# Patient Record
Sex: MALE | Race: WHITE | NOT HISPANIC OR LATINO | URBAN - METROPOLITAN AREA
[De-identification: names, ages, dates, MRNs, and addresses within clinical notes are randomized per-mention and may not be internally consistent; named-entity substitution may affect disease eponyms.]

---

## 2017-07-29 ENCOUNTER — EMERGENCY (EMERGENCY)
Facility: HOSPITAL | Age: 52
LOS: 1 days | Discharge: HOME | End: 2017-07-29
Attending: EMERGENCY MEDICINE | Admitting: EMERGENCY MEDICINE
Payer: OTHER MISCELLANEOUS

## 2017-07-29 VITALS
OXYGEN SATURATION: 98 % | DIASTOLIC BLOOD PRESSURE: 66 MMHG | SYSTOLIC BLOOD PRESSURE: 143 MMHG | HEART RATE: 90 BPM | TEMPERATURE: 98 F | RESPIRATION RATE: 17 BRPM

## 2017-07-29 DIAGNOSIS — Z98.89 OTHER SPECIFIED POSTPROCEDURAL STATES: Chronic | ICD-10-CM

## 2017-07-29 PROCEDURE — 99053 MED SERV 10PM-8AM 24 HR FAC: CPT

## 2017-07-29 PROCEDURE — 12002 RPR S/N/AX/GEN/TRNK2.6-7.5CM: CPT

## 2017-07-29 PROCEDURE — 99284 EMERGENCY DEPT VISIT MOD MDM: CPT | Mod: 25

## 2017-07-29 RX ORDER — CEPHALEXIN 500 MG
500 CAPSULE ORAL ONCE
Qty: 0 | Refills: 0 | Status: COMPLETED | OUTPATIENT
Start: 2017-07-29 | End: 2017-07-29

## 2017-07-29 RX ORDER — CEPHALEXIN 500 MG
1 CAPSULE ORAL
Qty: 9 | Refills: 0
Start: 2017-07-29

## 2017-07-29 RX ORDER — TETANUS TOXOID, REDUCED DIPHTHERIA TOXOID AND ACELLULAR PERTUSSIS VACCINE, ADSORBED 5; 2.5; 8; 8; 2.5 [IU]/.5ML; [IU]/.5ML; UG/.5ML; UG/.5ML; UG/.5ML
0.5 SUSPENSION INTRAMUSCULAR ONCE
Qty: 0 | Refills: 0 | Status: COMPLETED | OUTPATIENT
Start: 2017-07-29 | End: 2017-07-29

## 2017-07-29 RX ORDER — LIDOCAINE HCL 20 MG/ML
10 VIAL (ML) INJECTION ONCE
Qty: 0 | Refills: 0 | Status: COMPLETED | OUTPATIENT
Start: 2017-07-29 | End: 2017-07-29

## 2017-07-29 RX ADMIN — Medication 500 MILLIGRAM(S): at 10:14

## 2017-07-29 RX ADMIN — TETANUS TOXOID, REDUCED DIPHTHERIA TOXOID AND ACELLULAR PERTUSSIS VACCINE, ADSORBED 0.5 MILLILITER(S): 5; 2.5; 8; 8; 2.5 SUSPENSION INTRAMUSCULAR at 08:30

## 2017-07-29 RX ADMIN — Medication 10 MILLILITER(S): at 09:13

## 2017-07-29 NOTE — ED PROVIDER NOTE - OBJECTIVE STATEMENT
50 y/o M pt with PMHx of HLD c/o laceration to the left first digit 30 minutes ago. Pt is a  and was trying to sharpen his razor tool. Notes his hand slipped and cut his left first digit. Pt doesn't remember his last tetanus shot. Denies weakness or any other complaints. NKDA. Current medication: Lipitor, aspirin 52 y/o M pt with PMHx of HLD c/o laceration to the left first digit 30 minutes ago. Pt is a  and was trying to sharpen his razor tool. Notes his hand slipped and cut his left first digit. Pt doesn't remember his last tetanus shot. Denies weakness or any other complaints. NKDA. Current medication: Lipitor, aspirin.  Pt denies any injury elsewhere

## 2017-07-29 NOTE — ED PROVIDER NOTE - PROGRESS NOTE DETAILS
When cleaning wound, wound opened up more- will perform suture placement instead of dermabond. Wound superficial approximately 1 cm at thumb pad and going lateral to nail. Distal neurovascular intact Wound repaired. See procedure note for further details. Will discharge with keflex prophylaxis. Given note for work. When cleaning wound, wound opened up more- will perform suture placement instead of dermabond. Wound superficial approximately 4 cm at thumb pad and going lateral to nail. Distal neurovascular intact

## 2017-07-29 NOTE — ED PROVIDER NOTE - MEDICAL DECISION MAKING DETAILS
52 y/o M pt presents to the ED with laceration to the left 1st digit 30 minutes. Simple small laceration. Plan: will clean wound, tetanus and Dermabond. 50 y/o M pt presents to the ED with laceration to the left 1st digit 30 minutes. Simple small laceration. Plan: will clean wound, tetanus and Dermabond. Pt was offered but declined pain medication.

## 2017-07-29 NOTE — ED PROVIDER NOTE - PSH
Elbow pain, right  had surgery  S/P appendectomy  1981  S/P arthroscopy of right knee  March 2015  S/P ORIF (open reduction internal fixation) fracture  with ? partial hardware removal, right femur

## 2017-07-29 NOTE — ED PROVIDER NOTE - PMH
Fracture of femur  1983 & had surgery - Right  HLD (hyperlipidemia)    Knee injury  right  Obesity    Sleep apnea  not  using any Device at present

## 2017-07-29 NOTE — ED PROVIDER NOTE - ATTENDING CONTRIBUTION TO CARE
I performed a face to face evaluation of this patient and performed a full history and physical examination on the patient.  I agree with the resident's history, physical examination, and plan of the patient.  Pt with left thumb wound from razor while working, well appearing with left thumb laceration to tip pad and going around lateral to nail, approximately 1 cm, superficial, distal neurovascular intact. Tetanus, and suture.  Patient declined pain meds

## 2017-07-29 NOTE — ED PROVIDER NOTE - CARE PLAN
Principal Discharge DX:	Laceration of thumb Principal Discharge DX:	Laceration of thumb  Instructions for follow-up, activity and diet:	You received your Tdap (tetanus) vaccine today. Keep wound clean and dry. Apply bacitracin twice a day. Take Keflex (antibiotic) 500 mg every 12 hours for next 5 days (first dose given in Emergency Department). Return to Emergency Department or see your primary care doctor in 2 days for wound check. Return to Emergency Department or see your primary care doctor in 7-10 days for suture removal. Recommend not working until wound heals. Take ibuprofen (also known as Motrin or Advil) up to 600 mg (5 tablets of regular strength) up to every 8 hours as needed for pain. Take with food or milk to avoid stomach upset. Do not take every 8 hours continuously for more than 5 days to avoid potential problems with your kidneys. Return to Emergency Department for any new, worsening, and/or concerning symptoms, including but not limited to worsening pain, swelling of finger, redness of finger, foul discharge from wound, fevers. Principal Discharge DX:	Laceration of thumb  Instructions for follow-up, activity and diet:	You received your Tdap (tetanus) vaccine today. Keep wound clean and dry. Apply bacitracin twice a day. Take Keflex (antibiotic) 500 mg every 12 hours for next 5 days (first dose given in Emergency Department). Return to Emergency Department or see your primary care doctor in 2 days for wound check. Return to Emergency Department or see your primary care doctor in 7-10 days for suture removal. Recommend not working until wound heals. Take ibuprofen (also known as Motrin or Advil) up to 600 mg (3 tablets of regular strength) up to every 8 hours as needed for pain. Take with food or milk to avoid stomach upset. Do not take every 8 hours continuously for more than 5 days to avoid potential problems with your kidneys. Return to Emergency Department for any new, worsening, and/or concerning symptoms, including but not limited to worsening pain, swelling of finger, redness of finger, foul discharge from wound, fevers.

## 2017-07-29 NOTE — ED ADULT TRIAGE NOTE - CHIEF COMPLAINT QUOTE
Pt walk in c/o Lacerated wound on Left thumb with a knife. moderate bleeding noted Pressure dressing applied

## 2017-07-29 NOTE — ED PROCEDURE NOTE - ATTENDING CONTRIBUTION TO CARE
I performed a face to face evaluation of this patient and performed a full history and physical examination on the patient.  I agree with the resident's history, physical examination, and plan of the patient. I observed the key portion of the procedure

## 2017-07-29 NOTE — ED PROVIDER NOTE - PLAN OF CARE
You received your Tdap (tetanus) vaccine today. Keep wound clean and dry. Apply bacitracin twice a day. Take Keflex (antibiotic) 500 mg every 12 hours for next 5 days (first dose given in Emergency Department). Return to Emergency Department or see your primary care doctor in 2 days for wound check. Return to Emergency Department or see your primary care doctor in 7-10 days for suture removal. Recommend not working until wound heals. Take ibuprofen (also known as Motrin or Advil) up to 600 mg (5 tablets of regular strength) up to every 8 hours as needed for pain. Take with food or milk to avoid stomach upset. Do not take every 8 hours continuously for more than 5 days to avoid potential problems with your kidneys. Return to Emergency Department for any new, worsening, and/or concerning symptoms, including but not limited to worsening pain, swelling of finger, redness of finger, foul discharge from wound, fevers. You received your Tdap (tetanus) vaccine today. Keep wound clean and dry. Apply bacitracin twice a day. Take Keflex (antibiotic) 500 mg every 12 hours for next 5 days (first dose given in Emergency Department). Return to Emergency Department or see your primary care doctor in 2 days for wound check. Return to Emergency Department or see your primary care doctor in 7-10 days for suture removal. Recommend not working until wound heals. Take ibuprofen (also known as Motrin or Advil) up to 600 mg (3 tablets of regular strength) up to every 8 hours as needed for pain. Take with food or milk to avoid stomach upset. Do not take every 8 hours continuously for more than 5 days to avoid potential problems with your kidneys. Return to Emergency Department for any new, worsening, and/or concerning symptoms, including but not limited to worsening pain, swelling of finger, redness of finger, foul discharge from wound, fevers.

## 2017-07-31 ENCOUNTER — EMERGENCY (EMERGENCY)
Facility: HOSPITAL | Age: 52
LOS: 1 days | Discharge: HOME | End: 2017-07-31
Attending: EMERGENCY MEDICINE | Admitting: EMERGENCY MEDICINE

## 2017-07-31 VITALS
DIASTOLIC BLOOD PRESSURE: 76 MMHG | SYSTOLIC BLOOD PRESSURE: 122 MMHG | TEMPERATURE: 99 F | OXYGEN SATURATION: 100 % | RESPIRATION RATE: 16 BRPM | HEART RATE: 90 BPM

## 2017-07-31 DIAGNOSIS — Z98.89 OTHER SPECIFIED POSTPROCEDURAL STATES: Chronic | ICD-10-CM

## 2017-07-31 NOTE — ED PROVIDER NOTE - SKIN WOUND DESCRIPTION
Well healing laceration at tip of left thumb with 7 sutures in place. No drainage, erythema, or warmth. Appropriate tenderness present

## 2017-07-31 NOTE — ED PROVIDER NOTE - OBJECTIVE STATEMENT
52yo M with hx of AAA presents to the ED for wound check. Pt took a 80mg aspirin and is right handed. Pt was seen here 3 days ago for a razor laceration to the left thumb and had 7 sutures in place with abx usage. Was told to come back to the ED for wound check. Denies drainage, fever, chills, or any other complaints 52yo M with hx of AAA on ASA 81mg presents to the ED for wound check. Pt is right handed. Pt was seen here 3 days ago for a razor laceration to the left thumb and had 7 sutures in place with Keflex usage. Was told to come back to the ED for wound check. Denies drainage, fever, chills, or any other complaints

## 2017-08-10 ENCOUNTER — EMERGENCY (EMERGENCY)
Facility: HOSPITAL | Age: 52
LOS: 1 days | Discharge: HOME | End: 2017-08-10
Attending: EMERGENCY MEDICINE | Admitting: EMERGENCY MEDICINE
Payer: OTHER MISCELLANEOUS

## 2017-08-10 VITALS
SYSTOLIC BLOOD PRESSURE: 128 MMHG | OXYGEN SATURATION: 100 % | RESPIRATION RATE: 16 BRPM | HEART RATE: 85 BPM | TEMPERATURE: 99 F | DIASTOLIC BLOOD PRESSURE: 88 MMHG

## 2017-08-10 DIAGNOSIS — Z98.89 OTHER SPECIFIED POSTPROCEDURAL STATES: Chronic | ICD-10-CM

## 2017-08-10 PROCEDURE — 99053 MED SERV 10PM-8AM 24 HR FAC: CPT

## 2017-08-10 PROCEDURE — 99281 EMR DPT VST MAYX REQ PHY/QHP: CPT | Mod: 25

## 2017-08-10 NOTE — ED PROVIDER NOTE - OBJECTIVE STATEMENT
presents for suture removal, 12 days post suture. no complaints. no pain, discharge or erythema. no fevers.

## 2017-08-18 NOTE — ED PROVIDER NOTE - CROS ED SKIN ALL NEG
H/O colonoscopy  06/08/2017  H/O melanoma excision  ?  H/O: hysterectomy- 1982    Hx of tonsillectomy  in childhood  Squamous cell skin cancer  excision and biopsy > 1 year  Status post lung surgery > 7 years ago  lung ca. - - -

## 2017-12-25 NOTE — ED PROVIDER NOTE - CPE EDP SKIN NORM
"  Urgent Care Note   This patients PCP is: Pcp Pt States None    Reason for visit:   left knee pain     HPI:  Radha Urena is a 47 y.o. old patient who is seeing us today in the [facility] system.  This is a pleasant patient and I appreciate the opportunity to participate in the care of this patient.  This is a new problem today    1. Knee pain, left  Left knee  pain   Fell skiing 5 days ago. And painful the last two days are worse.  Has been ICE and elevation.  Hurts to walk.  Just feels its not getting better.       ROS:   Constitutional: no fatigue,   HEENT: No Headache, No sore throat.  Cardiac: No racing heart rate  Resp: No shortness of breath,  No cough, No wheezing.  Gastro: No nausea or vomiting, No diarrhea.    All other systems were reviewed and were negative.      Past Medical History:  Patient Active Problem List    Diagnosis Date Noted   • Left foot pain 12/25/2017       Past Surgical History:  No past surgical history on file.    Allergies:  Patient has no known allergies.    Social History:  Social History     Social History   • Marital status:      Spouse name: N/A   • Number of children: N/A   • Years of education: N/A     Occupational History   • Not on file.     Social History Main Topics   • Smoking status: Not on file   • Smokeless tobacco: Not on file   • Alcohol use Not on file   • Drug use: Unknown   • Sexual activity: Not on file     Other Topics Concern   • Not on file     Social History Narrative   • No narrative on file       Family History:  No family history on file.    Medications:  No current outpatient prescriptions on file.    Physical Examination:  Vital signs: /62   Pulse 67   Temp 37.1 °C (98.7 °F)   Resp 14   Ht 1.727 m (5' 8\")   Wt 78 kg (171 lb 15.3 oz)   SpO2 98%   BMI 26.15 kg/m²   General: No distress, cooperative, well dressed and well nourished.     Resp: Normal effort, Bilateral clear to auscultation, No wheezing,   CVS: Regular rate and " rhythm,  No murmur.   Neuro: Alert and oriented  Psych: Normal mood and affect    Left knee is swollen and is painful for 5 days.  Negative drawer sign.  Negative Varus and Valgus stress.  The knee does have fluid on it it seems.  More then likely a bursitis.      Assessment and Plan:    1. Knee pain, left  Xray ordered this looks wnl.  But for fluid on the knee.  Ibuprofen, RICE which I explained to him.  Brace.    He is from the [place] and traveling back in a few days I gave him Norco for the trip since he will be in some pain.    Did not hit his head      Follow-up with your PCP in 3-7 days if you are not getting better.  Return to  or the ER if symptoms become worse.  Patient understands these discharge instructions.      Milo Perkins P.A.-C.  12/25/17    CC:   Pcp Pt States None       WOUNDS

## 2018-07-16 PROBLEM — E78.5 HYPERLIPIDEMIA, UNSPECIFIED: Chronic | Status: INACTIVE | Noted: 2017-07-29 | Resolved: 2017-07-31

## 2019-05-02 ENCOUNTER — APPOINTMENT (OUTPATIENT)
Dept: CARDIOLOGY | Facility: CLINIC | Age: 54
End: 2019-05-02
Payer: COMMERCIAL

## 2019-05-02 VITALS
RESPIRATION RATE: 12 BRPM | HEART RATE: 68 BPM | TEMPERATURE: 98.4 F | SYSTOLIC BLOOD PRESSURE: 110 MMHG | DIASTOLIC BLOOD PRESSURE: 78 MMHG

## 2019-05-02 DIAGNOSIS — I25.2 OLD MYOCARDIAL INFARCTION: ICD-10-CM

## 2019-05-02 PROBLEM — E78.5 HYPERLIPIDEMIA, UNSPECIFIED: Chronic | Status: ACTIVE | Noted: 2017-07-31

## 2019-05-02 PROCEDURE — 99204 OFFICE O/P NEW MOD 45 MIN: CPT

## 2019-05-02 PROCEDURE — 93306 TTE W/DOPPLER COMPLETE: CPT

## 2019-05-04 NOTE — REASON FOR VISIT
[FreeTextEntry1] : The patient here for evaluation of high blood pressure. Patient is currently tolerating the current antihypertensive regime and they deny headaches, stiff neck, visual changes, or PND. The patient has been trying to stay on a low-sodium diet.\par the patient is here for followup of thoracic aortic aneurysm he has a history of coronary artery disease that is being medically treated. 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

## 2019-05-04 NOTE — PHYSICAL EXAM
[General Appearance - Well Developed] : well developed [Normal Appearance] : normal appearance [General Appearance - Well Nourished] : well nourished [No Deformities] : no deformities [Well Groomed] : well groomed [General Appearance - In No Acute Distress] : no acute distress [Normal Conjunctiva] : the conjunctiva exhibited no abnormalities [No Oral Pallor] : no oral pallor [Normal Oral Mucosa] : normal oral mucosa [Eyelids - No Xanthelasma] : the eyelids demonstrated no xanthelasmas [No Oral Cyanosis] : no oral cyanosis [Normal Jugular Venous A Waves Present] : normal jugular venous A waves present [Normal Jugular Venous V Waves Present] : normal jugular venous V waves present [No Jugular Venous Walls A Waves] : no jugular venous walls A waves [Auscultation Breath Sounds / Voice Sounds] : lungs were clear to auscultation bilaterally [Exaggerated Use Of Accessory Muscles For Inspiration] : no accessory muscle use [Respiration, Rhythm And Depth] : normal respiratory rhythm and effort [Abdomen Soft] : soft [Abdomen Tenderness] : non-tender [Abdomen Mass (___ Cm)] : no abdominal mass palpated [Gait - Sufficient For Exercise Testing] : the gait was sufficient for exercise testing [Abnormal Walk] : normal gait [Petechial Hemorrhages (___cm)] : no petechial hemorrhages [Cyanosis, Localized] : no localized cyanosis [Nail Clubbing] : no clubbing of the fingernails [Skin Color & Pigmentation] : normal skin color and pigmentation [] : no rash [No Venous Stasis] : no venous stasis [Skin Lesions] : no skin lesions [No Skin Ulcers] : no skin ulcer [No Xanthoma] : no  xanthoma was observed [Affect] : the affect was normal [Mood] : the mood was normal [Oriented To Time, Place, And Person] : oriented to person, place, and time [No Anxiety] : not feeling anxious [FreeTextEntry1] : Regular rate and rhythm, NL S1, S2, non-displaced PMI, chest non-tender; no rubs,heaves  or gallops a  Grade 2/6 systolic murmur noted at the LSB

## 2019-05-06 ENCOUNTER — APPOINTMENT (OUTPATIENT)
Dept: CARDIOLOGY | Facility: CLINIC | Age: 54
End: 2019-05-06
Payer: COMMERCIAL

## 2019-05-06 PROCEDURE — 78452 HT MUSCLE IMAGE SPECT MULT: CPT

## 2019-05-06 PROCEDURE — A9500: CPT

## 2019-05-06 PROCEDURE — 93015 CV STRESS TEST SUPVJ I&R: CPT

## 2019-06-25 DIAGNOSIS — R20.2 PARESTHESIA OF SKIN: ICD-10-CM

## 2019-10-01 ENCOUNTER — APPOINTMENT (OUTPATIENT)
Dept: CARDIOLOGY | Facility: CLINIC | Age: 54
End: 2019-10-01
Payer: COMMERCIAL

## 2019-10-01 VITALS
TEMPERATURE: 98 F | OXYGEN SATURATION: 99 % | HEIGHT: 74 IN | DIASTOLIC BLOOD PRESSURE: 64 MMHG | SYSTOLIC BLOOD PRESSURE: 110 MMHG | HEART RATE: 52 BPM | WEIGHT: 239 LBS | BODY MASS INDEX: 30.67 KG/M2

## 2019-10-01 DIAGNOSIS — I65.23 OCCLUSION AND STENOSIS OF BILATERAL CAROTID ARTERIES: ICD-10-CM

## 2019-10-01 PROCEDURE — 99214 OFFICE O/P EST MOD 30 MIN: CPT

## 2019-10-01 PROCEDURE — 93000 ELECTROCARDIOGRAM COMPLETE: CPT

## 2019-10-01 NOTE — REASON FOR VISIT
[FreeTextEntry1] : The patient here for evaluation of high blood pressure. Patient is currently tolerating the current antihypertensive regime and they deny headaches, stiff neck, visual changes, or PND. The patient has been trying to stay on a low-sodium diet.\par the patient is here for followup of thoracic aortic aneurysm he has a history of coronary artery disease that is being medically treated. 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

## 2019-10-01 NOTE — PHYSICAL EXAM
[General Appearance - Well Developed] : well developed [Normal Appearance] : normal appearance [Well Groomed] : well groomed [General Appearance - Well Nourished] : well nourished [No Deformities] : no deformities [General Appearance - In No Acute Distress] : no acute distress [Normal Conjunctiva] : the conjunctiva exhibited no abnormalities [Eyelids - No Xanthelasma] : the eyelids demonstrated no xanthelasmas [Normal Oral Mucosa] : normal oral mucosa [No Oral Pallor] : no oral pallor [No Oral Cyanosis] : no oral cyanosis [Normal Jugular Venous A Waves Present] : normal jugular venous A waves present [Normal Jugular Venous V Waves Present] : normal jugular venous V waves present [No Jugular Venous Walls A Waves] : no jugular venous walls A waves [Respiration, Rhythm And Depth] : normal respiratory rhythm and effort [Exaggerated Use Of Accessory Muscles For Inspiration] : no accessory muscle use [Auscultation Breath Sounds / Voice Sounds] : lungs were clear to auscultation bilaterally [Abdomen Soft] : soft [Abdomen Tenderness] : non-tender [Abdomen Mass (___ Cm)] : no abdominal mass palpated [Abnormal Walk] : normal gait [Gait - Sufficient For Exercise Testing] : the gait was sufficient for exercise testing [Nail Clubbing] : no clubbing of the fingernails [Cyanosis, Localized] : no localized cyanosis [Petechial Hemorrhages (___cm)] : no petechial hemorrhages [Skin Color & Pigmentation] : normal skin color and pigmentation [] : no rash [No Venous Stasis] : no venous stasis [Skin Lesions] : no skin lesions [No Skin Ulcers] : no skin ulcer [No Xanthoma] : no  xanthoma was observed [Oriented To Time, Place, And Person] : oriented to person, place, and time [Affect] : the affect was normal [Mood] : the mood was normal [No Anxiety] : not feeling anxious [FreeTextEntry1] : Regular rate and rhythm, NL S1, S2, non-displaced PMI, chest non-tender; no rubs,heaves  or gallops a  Grade 2/6 systolic murmur noted at the LSB

## 2020-01-10 ENCOUNTER — MEDICATION RENEWAL (OUTPATIENT)
Age: 55
End: 2020-01-10

## 2020-01-24 PROBLEM — Z00.00 ENCOUNTER FOR PREVENTIVE HEALTH EXAMINATION: Noted: 2020-01-24

## 2020-02-06 ENCOUNTER — APPOINTMENT (OUTPATIENT)
Dept: CARDIOLOGY | Facility: CLINIC | Age: 55
End: 2020-02-06
Payer: COMMERCIAL

## 2020-02-06 ENCOUNTER — APPOINTMENT (OUTPATIENT)
Dept: CARDIOLOGY | Facility: CLINIC | Age: 55
End: 2020-02-06

## 2020-02-06 VITALS
WEIGHT: 239 LBS | DIASTOLIC BLOOD PRESSURE: 72 MMHG | TEMPERATURE: 97.8 F | OXYGEN SATURATION: 97 % | HEIGHT: 74 IN | HEART RATE: 63 BPM | BODY MASS INDEX: 30.67 KG/M2 | SYSTOLIC BLOOD PRESSURE: 119 MMHG

## 2020-02-06 PROCEDURE — 99214 OFFICE O/P EST MOD 30 MIN: CPT

## 2020-02-06 NOTE — PHYSICAL EXAM
[Normal Appearance] : normal appearance [General Appearance - Well Developed] : well developed [Well Groomed] : well groomed [General Appearance - Well Nourished] : well nourished [No Deformities] : no deformities [General Appearance - In No Acute Distress] : no acute distress [Eyelids - No Xanthelasma] : the eyelids demonstrated no xanthelasmas [Normal Conjunctiva] : the conjunctiva exhibited no abnormalities [Normal Oral Mucosa] : normal oral mucosa [No Oral Pallor] : no oral pallor [No Oral Cyanosis] : no oral cyanosis [Normal Jugular Venous A Waves Present] : normal jugular venous A waves present [Normal Jugular Venous V Waves Present] : normal jugular venous V waves present [No Jugular Venous Walls A Waves] : no jugular venous walls A waves [Respiration, Rhythm And Depth] : normal respiratory rhythm and effort [Auscultation Breath Sounds / Voice Sounds] : lungs were clear to auscultation bilaterally [Exaggerated Use Of Accessory Muscles For Inspiration] : no accessory muscle use [Heart Rate And Rhythm] : heart rate and rhythm were normal [Heart Sounds] : normal S1 and S2 [Murmurs] : no murmurs present [Abdomen Soft] : soft [Abdomen Tenderness] : non-tender [Abdomen Mass (___ Cm)] : no abdominal mass palpated [Gait - Sufficient For Exercise Testing] : the gait was sufficient for exercise testing [Abnormal Walk] : normal gait [Nail Clubbing] : no clubbing of the fingernails [Cyanosis, Localized] : no localized cyanosis [Petechial Hemorrhages (___cm)] : no petechial hemorrhages [] : no rash [Skin Color & Pigmentation] : normal skin color and pigmentation [Skin Lesions] : no skin lesions [No Venous Stasis] : no venous stasis [No Skin Ulcers] : no skin ulcer [Oriented To Time, Place, And Person] : oriented to person, place, and time [No Xanthoma] : no  xanthoma was observed [Affect] : the affect was normal [No Anxiety] : not feeling anxious [Mood] : the mood was normal

## 2020-02-11 NOTE — HISTORY OF PRESENT ILLNESS
[FreeTextEntry1] : pt is here for follow up of TAA they have been taking medication as prescribed  and  blood pressure have been observed in the a reasonable range, their are no reports of SSCP , back pain or syncope. \par 4.5 cm dilation at sinus Valsalve on last MRI in August 2018\par The patient is here for follow-up of elevated cholesterol. Patient is currently tolerating medication and denies muscle pain, joint pain, back pain,  urinary changes , nausea, vomiting, abdominal pain or diarrhea. The patient is trying to follow a low cholesterol diet.

## 2020-07-08 ENCOUNTER — RX RENEWAL (OUTPATIENT)
Age: 55
End: 2020-07-08

## 2020-08-06 RX ORDER — ASPIRIN ENTERIC COATED TABLETS 81 MG 81 MG/1
81 TABLET, DELAYED RELEASE ORAL
Qty: 30 | Refills: 3 | Status: ACTIVE | COMMUNITY
Start: 2020-08-06

## 2020-08-10 ENCOUNTER — APPOINTMENT (OUTPATIENT)
Dept: CARDIOLOGY | Facility: CLINIC | Age: 55
End: 2020-08-10
Payer: COMMERCIAL

## 2020-08-10 ENCOUNTER — NON-APPOINTMENT (OUTPATIENT)
Age: 55
End: 2020-08-10

## 2020-08-10 VITALS — OXYGEN SATURATION: 98 % | HEART RATE: 50 BPM | SYSTOLIC BLOOD PRESSURE: 118 MMHG | DIASTOLIC BLOOD PRESSURE: 70 MMHG

## 2020-08-10 DIAGNOSIS — R93.1 ABNORMAL FINDINGS ON DIAGNOSTIC IMAGING OF HEART AND CORONARY CIRCULATION: ICD-10-CM

## 2020-08-10 PROCEDURE — 99214 OFFICE O/P EST MOD 30 MIN: CPT

## 2020-08-10 PROCEDURE — 93000 ELECTROCARDIOGRAM COMPLETE: CPT

## 2020-08-11 LAB — SARS-COV-2 N GENE NPH QL NAA+PROBE: NOT DETECTED

## 2020-08-13 ENCOUNTER — INPATIENT (INPATIENT)
Facility: HOSPITAL | Age: 55
LOS: 0 days | Discharge: HOME | DRG: 247 | End: 2020-08-14
Attending: INTERNAL MEDICINE | Admitting: INTERNAL MEDICINE
Payer: COMMERCIAL

## 2020-08-13 VITALS
TEMPERATURE: 99 F | HEIGHT: 74 IN | SYSTOLIC BLOOD PRESSURE: 132 MMHG | DIASTOLIC BLOOD PRESSURE: 84 MMHG | RESPIRATION RATE: 17 BRPM | WEIGHT: 270.07 LBS | OXYGEN SATURATION: 100 % | HEART RATE: 71 BPM

## 2020-08-13 DIAGNOSIS — Z98.89 OTHER SPECIFIED POSTPROCEDURAL STATES: Chronic | ICD-10-CM

## 2020-08-13 DIAGNOSIS — R93.1 ABNORMAL FINDINGS ON DIAGNOSTIC IMAGING OF HEART AND CORONARY CIRCULATION: ICD-10-CM

## 2020-08-13 LAB
ANION GAP SERPL CALC-SCNC: 12 MMOL/L — SIGNIFICANT CHANGE UP (ref 5–17)
BUN SERPL-MCNC: 17 MG/DL — SIGNIFICANT CHANGE UP (ref 7–23)
CALCIUM SERPL-MCNC: 10.1 MG/DL — SIGNIFICANT CHANGE UP (ref 8.4–10.5)
CHLORIDE SERPL-SCNC: 102 MMOL/L — SIGNIFICANT CHANGE UP (ref 96–108)
CO2 SERPL-SCNC: 27 MMOL/L — SIGNIFICANT CHANGE UP (ref 22–31)
CREAT SERPL-MCNC: 0.93 MG/DL — SIGNIFICANT CHANGE UP (ref 0.5–1.3)
GLUCOSE SERPL-MCNC: 84 MG/DL — SIGNIFICANT CHANGE UP (ref 70–99)
HCT VFR BLD CALC: 42.3 % — SIGNIFICANT CHANGE UP (ref 39–50)
HGB BLD-MCNC: 14 G/DL — SIGNIFICANT CHANGE UP (ref 13–17)
MCHC RBC-ENTMCNC: 30.8 PG — SIGNIFICANT CHANGE UP (ref 27–34)
MCHC RBC-ENTMCNC: 33.1 GM/DL — SIGNIFICANT CHANGE UP (ref 32–36)
MCV RBC AUTO: 93 FL — SIGNIFICANT CHANGE UP (ref 80–100)
NRBC # BLD: 0 /100 WBCS — SIGNIFICANT CHANGE UP (ref 0–0)
PLATELET # BLD AUTO: 147 K/UL — LOW (ref 150–400)
POTASSIUM SERPL-MCNC: 4.7 MMOL/L — SIGNIFICANT CHANGE UP (ref 3.5–5.3)
POTASSIUM SERPL-SCNC: 4.7 MMOL/L — SIGNIFICANT CHANGE UP (ref 3.5–5.3)
RBC # BLD: 4.55 M/UL — SIGNIFICANT CHANGE UP (ref 4.2–5.8)
RBC # FLD: 11.9 % — SIGNIFICANT CHANGE UP (ref 10.3–14.5)
SODIUM SERPL-SCNC: 141 MMOL/L — SIGNIFICANT CHANGE UP (ref 135–145)
WBC # BLD: 7.14 K/UL — SIGNIFICANT CHANGE UP (ref 3.8–10.5)
WBC # FLD AUTO: 7.14 K/UL — SIGNIFICANT CHANGE UP (ref 3.8–10.5)

## 2020-08-13 PROCEDURE — 92928 PRQ TCAT PLMT NTRAC ST 1 LES: CPT | Mod: LD

## 2020-08-13 PROCEDURE — 99152 MOD SED SAME PHYS/QHP 5/>YRS: CPT

## 2020-08-13 PROCEDURE — 93010 ELECTROCARDIOGRAM REPORT: CPT | Mod: 77

## 2020-08-13 PROCEDURE — 93454 CORONARY ARTERY ANGIO S&I: CPT | Mod: 26,59

## 2020-08-13 RX ORDER — CLOPIDOGREL BISULFATE 75 MG/1
1 TABLET, FILM COATED ORAL
Qty: 90 | Refills: 3
Start: 2020-08-13 | End: 2021-08-07

## 2020-08-13 RX ORDER — ASPIRIN/CALCIUM CARB/MAGNESIUM 324 MG
81 TABLET ORAL DAILY
Refills: 0 | Status: DISCONTINUED | OUTPATIENT
Start: 2020-08-14 | End: 2020-08-14

## 2020-08-13 RX ORDER — OMEPRAZOLE 10 MG/1
1 CAPSULE, DELAYED RELEASE ORAL
Qty: 0 | Refills: 0 | DISCHARGE

## 2020-08-13 RX ORDER — CLOPIDOGREL BISULFATE 75 MG/1
75 TABLET, FILM COATED ORAL DAILY
Refills: 0 | Status: DISCONTINUED | OUTPATIENT
Start: 2020-08-14 | End: 2020-08-14

## 2020-08-13 RX ORDER — ASPIRIN/CALCIUM CARB/MAGNESIUM 324 MG
1 TABLET ORAL
Qty: 0 | Refills: 0 | DISCHARGE

## 2020-08-13 RX ORDER — METOPROLOL TARTRATE 50 MG
1 TABLET ORAL
Qty: 0 | Refills: 0 | DISCHARGE

## 2020-08-13 RX ORDER — ATORVASTATIN CALCIUM 80 MG/1
40 TABLET, FILM COATED ORAL AT BEDTIME
Refills: 0 | Status: DISCONTINUED | OUTPATIENT
Start: 2020-08-13 | End: 2020-08-14

## 2020-08-13 RX ORDER — METOPROLOL TARTRATE 50 MG
25 TABLET ORAL DAILY
Refills: 0 | Status: DISCONTINUED | OUTPATIENT
Start: 2020-08-13 | End: 2020-08-14

## 2020-08-13 RX ORDER — PANTOPRAZOLE SODIUM 20 MG/1
40 TABLET, DELAYED RELEASE ORAL
Refills: 0 | Status: DISCONTINUED | OUTPATIENT
Start: 2020-08-13 | End: 2020-08-14

## 2020-08-13 RX ADMIN — ATORVASTATIN CALCIUM 40 MILLIGRAM(S): 80 TABLET, FILM COATED ORAL at 21:43

## 2020-08-13 NOTE — ASU PREOP CHECKLIST - WAS PATIENT ON BETA BLOCKER?
Patient states that she has a hx of asthma took inhaler did not work having trouble breathing and experienced vomiting. Being having this issue for a few hours now.
Yes

## 2020-08-13 NOTE — H&P CARDIOLOGY - HISTORY OF PRESENT ILLNESS
This is a 53y/o  male with Covid 19 negative swab , no implantable devices noted with PMHX of  MI in , Hypercholesterolemia, Aortic aneurysm, Obesity, +former tobacco smoker , Family hx MI (pt father  from MI) and Sleep Apnea non-compliant with CPAP. PT presents with recent complaints of " indigestion" after eating sauce. Pt was in [place] and went to PMD who referred to hospital and pt walked out " too crowded" call Physician in [place] and  went to Dr. Saha and had CT of heart that was abnormal in 2020 . [place] returned [place] from [place] on 8/10/20 . Now presents for  Scheduled LHC with Dr. Harrison today . Currently Cp free no sob no palpitations no lightheadedness or dizziness noted.     < from: 12 Lead ECG (20 @ 07:00) >  Ventricular Rate 51 BPM    Atrial Rate 51 BPM    P-R Interval 126 ms    QRS Duration 114 ms    Q-T Interval 480 ms    QTC Calculation(Bezet) 442 ms    P Axis 51 degrees    R Axis -12 degrees    T Axis 13 degrees    Diagnosis Line SINUS BRADYCARDIA  LEFTWARD AXIS  CONSIDER LATERAL MI, Q WAVES NOTED  NON-SPECIFIC INTRA-VENTRICULAR CONDUCTION DELAY  ABNORMAL ECG  NO PREVIOUS ECGS AVAILABLE  Confirmed by MD Jon, Gene ([id number]) on 2020 11:15:27 AM    < end of copied text > This is a 55y/o  male with Covid 19 negative swab from 20 , no implantable devices noted with PMHX of MI in , HLD, Aortic aneurysm, Obesity, +former tobacco smoker , hx gastric bypass  , Family hx MI (pt father  from MI) and Sleep Apnea non-compliant with CPAP. PT presents with recent complaints of " indigestion" after eating sauce. Pt was in [place] and went to PMD who referred to hospital and pt walked out " too crowded" call Physician in [place] and  went to Dr. Saha and had CT of heart that was abnormal in 2020 . [place] returned [place] from [place] on 8/10/20 . Now presents today for Scheduled LHC with Dr. Harrison  . Currently Cp free no sob no palpitations no lightheadedness or dizziness noted.     < from: 12 Lead ECG (20 @ 07:00) >  Ventricular Rate 51 BPM    Atrial Rate 51 BPM    P-R Interval 126 ms    QRS Duration 114 ms    Q-T Interval 480 ms    QTC Calculation(Bezet) 442 ms    P Axis 51 degrees    R Axis -12 degrees    T Axis 13 degrees    Diagnosis Line SINUS BRADYCARDIA  LEFTWARD AXIS  CONSIDER LATERAL MI, Q WAVES NOTED  NON-SPECIFIC INTRA-VENTRICULAR CONDUCTION DELAY  ABNORMAL ECG  NO PREVIOUS ECGS AVAILABLE  Confirmed by MD Jon, Gene ([id number]) on 2020 11:15:27 AM    < end of copied text > This is a 53y/o  male with Covid 19 negative swab from 20 , no implantable devices noted with PMHX of MI in , HLD, Aortic aneurysm, Obesity, +former tobacco smoker , hx gastric bypass  , Family hx MI (pt father  from MI) and Sleep Apnea non-compliant with CPAP. PT presents with recent complaints of " indigestion" after eating sauce. Pt was in [place] and went to PMD who referred to hospital and pt walked out " too crowded" call Physician in [place] and  went to Dr. Saha and had CT of heart that was abnormal in 2020 . [place] on 8/10/20 . Now presents today for Scheduled LHC with Dr. Harrison  . Currently Cp free no sob no palpitations no lightheadedness or dizziness noted.   7/15/20 CT Heart= The left main coronary artery is very short and gives rise to the left anterior descending artery and left circumflex artery. There is a large amount of calcified plaque in the left main resulting in moderate stenosis 50-69% LAD has a large amount of predominately calcified Plaque in the proximal LAD, resulting in Severe Stenosis 70-99% Large amount of plaque in left CX moderate stenosis. RCA mild stenosis of the proximal RCA 50-69% stenosis. Calcium score is 1625       < from: 12 Lead ECG (20 @ 07:00) >  Ventricular Rate 51 BPM    Atrial Rate 51 BPM    P-R Interval 126 ms    QRS Duration 114 ms    Q-T Interval 480 ms    QTC Calculation(Bezet) 442 ms    P Axis 51 degrees    R Axis -12 degrees    T Axis 13 degrees    Diagnosis Line SINUS BRADYCARDIA  LEFTWARD AXIS  CONSIDER LATERAL MI, Q WAVES NOTED  NON-SPECIFIC INTRA-VENTRICULAR CONDUCTION DELAY  ABNORMAL ECG  NO PREVIOUS ECGS AVAILABLE  Confirmed by MD Webb Jeffrey ([id number]) on 2020 11:15:27 AM    < end of copied text >

## 2020-08-13 NOTE — H&P CARDIOLOGY - PSH
Elbow pain, right  had surgery  H/O gastric bypass  11/28/2017 @ [facility]  H/O hernia repair  sept 2018 @ [facility]  H/O knee surgery    History of appendectomy    S/P appendectomy  1981  S/P arthroscopy of right knee  March 2015  S/P ORIF (open reduction internal fixation) fracture  with ? partial hardware removal, right femur

## 2020-08-13 NOTE — ASU PATIENT PROFILE, ADULT - PATIENT REPRESENTATIVE PHONE
[FreeTextEntry1] : Documented by Zena Chandra acting as a scribe for Dr. Pro Calderon on 09/05/2019.\par \par All medical record entries made by the Scribe were at my, Dr. Calderon, direction and personally dictated by me on 09/05/2019. I have reviewed the chart and agree that the record accurately reflects my personal performance of the history, physical exam, assessment and plan. I have also personally directed, reviewed, and agree with the discharge instructions.\par \par \par   [phone]

## 2020-08-13 NOTE — H&P CARDIOLOGY - FAMILY HISTORY
Father  Still living? No  Family history of myocardial infarction in first degree male relative, Age at diagnosis: Age Unknown

## 2020-08-14 ENCOUNTER — TRANSCRIPTION ENCOUNTER (OUTPATIENT)
Age: 55
End: 2020-08-14

## 2020-08-14 VITALS
RESPIRATION RATE: 18 BRPM | DIASTOLIC BLOOD PRESSURE: 81 MMHG | TEMPERATURE: 98 F | OXYGEN SATURATION: 98 % | HEART RATE: 61 BPM | SYSTOLIC BLOOD PRESSURE: 121 MMHG

## 2020-08-14 PROCEDURE — 93010 ELECTROCARDIOGRAM REPORT: CPT

## 2020-08-14 PROCEDURE — 99152 MOD SED SAME PHYS/QHP 5/>YRS: CPT

## 2020-08-14 PROCEDURE — 99231 SBSQ HOSP IP/OBS SF/LOW 25: CPT | Mod: GC

## 2020-08-14 PROCEDURE — 85027 COMPLETE CBC AUTOMATED: CPT

## 2020-08-14 PROCEDURE — C1894: CPT

## 2020-08-14 PROCEDURE — 99153 MOD SED SAME PHYS/QHP EA: CPT

## 2020-08-14 PROCEDURE — C1874: CPT

## 2020-08-14 PROCEDURE — C1887: CPT

## 2020-08-14 PROCEDURE — 93454 CORONARY ARTERY ANGIO S&I: CPT | Mod: 59

## 2020-08-14 PROCEDURE — 93306 TTE W/DOPPLER COMPLETE: CPT | Mod: 26

## 2020-08-14 PROCEDURE — C9600: CPT | Mod: LD

## 2020-08-14 PROCEDURE — C8929: CPT

## 2020-08-14 PROCEDURE — 93005 ELECTROCARDIOGRAM TRACING: CPT

## 2020-08-14 PROCEDURE — C1769: CPT

## 2020-08-14 PROCEDURE — 80048 BASIC METABOLIC PNL TOTAL CA: CPT

## 2020-08-14 RX ORDER — ATORVASTATIN CALCIUM 80 MG/1
1 TABLET, FILM COATED ORAL
Qty: 30 | Refills: 0
Start: 2020-08-14

## 2020-08-14 RX ORDER — ATORVASTATIN CALCIUM 80 MG/1
1 TABLET, FILM COATED ORAL
Qty: 0 | Refills: 0 | DISCHARGE

## 2020-08-14 RX ADMIN — PANTOPRAZOLE SODIUM 40 MILLIGRAM(S): 20 TABLET, DELAYED RELEASE ORAL at 05:36

## 2020-08-14 RX ADMIN — Medication 81 MILLIGRAM(S): at 13:22

## 2020-08-14 RX ADMIN — CLOPIDOGREL BISULFATE 75 MILLIGRAM(S): 75 TABLET, FILM COATED ORAL at 13:22

## 2020-08-14 RX ADMIN — Medication 25 MILLIGRAM(S): at 09:28

## 2020-08-14 RX ADMIN — ATORVASTATIN CALCIUM 40 MILLIGRAM(S): 80 TABLET, FILM COATED ORAL at 18:58

## 2020-08-14 NOTE — CONSULT NOTE ADULT - SUBJECTIVE AND OBJECTIVE BOX
CHIEF COMPLAINT: NSVT    HISTORY OF PRESENT ILLNESS:  55y/o male h/o HTN, HLD, s/p prior bariatric surgery, NL LVEF from Echo ~1year ago presents for elective cath s/p PCI to [facility] 8/13 & this afternoon had a run of asymptomatic NSVT lasting 10beats @ 150bpm. Patient denies lightheadedness, syncope, palps, CP or SOB.  Patient on metoprolol 25mg daily.      Allergies  No Known Allergies    MEDICATIONS:  aspirin enteric coated 81 milliGRAM(s) Oral daily  clopidogrel Tablet 75 milliGRAM(s) Oral daily  metoprolol succinate ER 25 milliGRAM(s) Oral daily  pantoprazole    Tablet 40 milliGRAM(s) Oral before breakfast  atorvastatin 40 milliGRAM(s) Oral at bedtime      PAST MEDICAL & SURGICAL HISTORY:  MI (myocardial infarction): in 1997  Aortic aneurysm  HLD (hyperlipidemia)  Fracture of femur: 1983 &amp; had surgery - Right  Sleep apnea: not  using any Device at present  Knee injury: right  Obesity  H/O gastric bypass: 11/28/2017 @ [facility]  History of appendectomy  H/O knee surgery  H/O hernia repair: sept 2018 @ [facility]  S/P arthroscopy of right knee: March 2015  Elbow pain, right: had surgery  S/P ORIF (open reduction internal fixation) fracture: with ? partial hardware removal, right femur  S/P appendectomy: 1981      FAMILY HISTORY:  Family history of myocardial infarction in first degree male relative      SOCIAL HISTORY:    No toxic habits      REVIEW OF SYSTEMS:  See HPI. Otherwise, 10 point ROS done and otherwise negative.    PHYSICAL EXAM:  T(C): 36.5 (08-14-20 @ 12:37), Max: 36.7 (08-13-20 @ 20:58)  HR: 61 (08-14-20 @ 12:37) (52 - 85)  BP: 121/81 (08-14-20 @ 12:37) (102/64 - 121/81)  RR: 18 (08-14-20 @ 12:37) (16 - 18)  SpO2: 98% (08-14-20 @ 12:37) (97% - 99%)  Wt(kg): --  I&O's Summary    13 Aug 2020 07:01  -  14 Aug 2020 07:00  --------------------------------------------------------  IN: 360 mL / OUT: 750 mL / NET: -390 mL    14 Aug 2020 07:01  -  14 Aug 2020 15:38  --------------------------------------------------------  IN: 240 mL / OUT: 0 mL / NET: 240 mL        Appearance: Alert. NAD	  HEENT:   NC/AT	  Cardiovascular: +S1S2 RRR no m/g/r  Respiratory: CTA B/L	  Psychiatry: A & O x 3, Mood & affect appropriate  Gastrointestinal:  Soft, NT.ND., + BS	  Skin: No rashes	  Neurologic: Non-focal  Extremities: No edema BLE  Vascular: Peripheral pulses palpable 2+ bilaterally      LABS:	 	    CBC Full  -  ( 13 Aug 2020 15:49 )  WBC Count : 7.14 K/uL  Hemoglobin : 14.0 g/dL  Hematocrit : 42.3 %  Platelet Count - Automated : 147 K/uL  Mean Cell Volume : 93.0 fl  Mean Cell Hemoglobin : 30.8 pg  Mean Cell Hemoglobin Concentration : 33.1 gm/dL    08-13    141  |  102  |  17  ----------------------------<  84  4.7   |  27  |  0.93    Ca    10.1      13 Aug 2020 15:49    TELEMETRY: SR 50-80's bpm; 10beats of NSVT @ 150bpm      PREVIOUS DIAGNOSTIC TESTING:    Echocardiogram(5/2019):  NL LVEF; no significant valvular abnormalities    Catheterization(8/13/2020):  CORONARY VESSELS: The coronary circulation is right dominant.  LM:   --  LM: Normal.  LAD:   --  Mid LAD: There was a 80 % stenosis.  CX:   --  Circumflex: Normal.  RCA:   --  RCA: Normal.  COMPLICATIONS: There were no complications.  DIAGNOSTIC RECOMMENDATIONS: 1. Successful PCI to mid LAD lesion  2. Continue DAPT, high intensity statin and continue BP control.  INTERVENTIONAL RECOMMENDATIONS: 1. Successful PCI to mid LAD lesion  2. Continue DAPT, high intensity statin and continue BP control.  	  ASSESSMENT/PLAN: 	  55y/o male h/o HTN, HLD, s/p prior bariatric surgery, NL LVEF from Echo ~1year ago presents for elective cath s/p PCI to mLAD 8/13 & this afternoon had a run of asymptomatic NSVT lasting 10beats @ 150bpm.  1. CAD s/p PCI to mLAD 8/13  2. NL EF  3. HTN  4. Asymptomatic NSVT (10beats @ ~150bpm)    --In setting of normal LVEF, would continue bblockers for asymptomatic NSVT. No further EP recommendations. EP will sign off. Reconsult prn    Olimpia Ramirez PA-C  [id number] CHIEF COMPLAINT: NSVT    HISTORY OF PRESENT ILLNESS:  53y/o male h/o HTN, HLD, s/p prior bariatric surgery, NL LVEF from Echo ~1year ago presents for elective cath s/p PCI to [facility] 8/13 & this afternoon had a run of asymptomatic NSVT lasting 10beats @ 150bpm. Patient denies lightheadedness, syncope, palps, CP or SOB.  Patient on metoprolol 25mg daily.      Allergies  No Known Allergies    MEDICATIONS:  aspirin enteric coated 81 milliGRAM(s) Oral daily  clopidogrel Tablet 75 milliGRAM(s) Oral daily  metoprolol succinate ER 25 milliGRAM(s) Oral daily  pantoprazole    Tablet 40 milliGRAM(s) Oral before breakfast  atorvastatin 40 milliGRAM(s) Oral at bedtime      PAST MEDICAL & SURGICAL HISTORY:  MI (myocardial infarction): in 1997  Aortic aneurysm  HLD (hyperlipidemia)  Fracture of femur: 1983 &amp; had surgery - Right  Sleep apnea: not  using any Device at present  Knee injury: right  Obesity  H/O gastric bypass: 11/28/2017 @ [facility]  History of appendectomy  H/O knee surgery  H/O hernia repair: sept 2018 @ [facility]  S/P arthroscopy of right knee: March 2015  Elbow pain, right: had surgery  S/P ORIF (open reduction internal fixation) fracture: with ? partial hardware removal, right femur  S/P appendectomy: 1981      FAMILY HISTORY:  Family history of myocardial infarction in first degree male relative      SOCIAL HISTORY:    No toxic habits      REVIEW OF SYSTEMS:  See HPI. Otherwise, 10 point ROS done and otherwise negative.    PHYSICAL EXAM:  T(C): 36.5 (08-14-20 @ 12:37), Max: 36.7 (08-13-20 @ 20:58)  HR: 61 (08-14-20 @ 12:37) (52 - 85)  BP: 121/81 (08-14-20 @ 12:37) (102/64 - 121/81)  RR: 18 (08-14-20 @ 12:37) (16 - 18)  SpO2: 98% (08-14-20 @ 12:37) (97% - 99%)  Wt(kg): --  I&O's Summary    13 Aug 2020 07:01  -  14 Aug 2020 07:00  --------------------------------------------------------  IN: 360 mL / OUT: 750 mL / NET: -390 mL    14 Aug 2020 07:01  -  14 Aug 2020 15:38  --------------------------------------------------------  IN: 240 mL / OUT: 0 mL / NET: 240 mL        Appearance: Alert. NAD	  HEENT:   NC/AT	  Cardiovascular: +S1S2 RRR no m/g/r  Respiratory: CTA B/L	  Psychiatry: A & O x 3, Mood & affect appropriate  Gastrointestinal:  Soft, NT.ND., + BS	  Skin: No rashes	  Neurologic: Non-focal  Extremities: No edema BLE  Vascular: Peripheral pulses palpable 2+ bilaterally      LABS:	 	    CBC Full  -  ( 13 Aug 2020 15:49 )  WBC Count : 7.14 K/uL  Hemoglobin : 14.0 g/dL  Hematocrit : 42.3 %  Platelet Count - Automated : 147 K/uL  Mean Cell Volume : 93.0 fl  Mean Cell Hemoglobin : 30.8 pg  Mean Cell Hemoglobin Concentration : 33.1 gm/dL    08-13    141  |  102  |  17  ----------------------------<  84  4.7   |  27  |  0.93    Ca    10.1      13 Aug 2020 15:49    TELEMETRY: SR 50-80's bpm; 10beats of NSVT @ 150bpm      PREVIOUS DIAGNOSTIC TESTING:    Echocardiogram(5/2019):  NL LVEF; no significant valvular abnormalities    Catheterization(8/13/2020):  CORONARY VESSELS: The coronary circulation is right dominant.  LM:   --  LM: Normal.  LAD:   --  Mid LAD: There was a 80 % stenosis.  CX:   --  Circumflex: Normal.  RCA:   --  RCA: Normal.  COMPLICATIONS: There were no complications.  DIAGNOSTIC RECOMMENDATIONS: 1. Successful PCI to mid LAD lesion  2. Continue DAPT, high intensity statin and continue BP control.  INTERVENTIONAL RECOMMENDATIONS: 1. Successful PCI to mid LAD lesion  2. Continue DAPT, high intensity statin and continue BP control.  	  ASSESSMENT/PLAN: 	  53y/o male h/o HTN, HLD, s/p prior bariatric surgery, NL LVEF from Echo ~1year ago presents for elective cath s/p PCI to mLAD 8/13 & this afternoon had a run of asymptomatic NSVT lasting 10beats @ 150bpm.  1. CAD s/p PCI to mLAD 8/13  2. NL EF  3. HTN  4. Asymptomatic NSVT (10beats @ ~150bpm)    --In setting of normal LVEF, would continue bblockers for asymptomatic NSVT. No further EP recommendations. EP will sign off. Reconsult prn  --Discussed with Dr. Yoel Ramirez, PA-C  [id number] CHIEF COMPLAINT: NSVT    HISTORY OF PRESENT ILLNESS:  55y/o male h/o HTN, HLD, s/p prior bariatric surgery, NL LVEF presents for elective cath s/p PCI to [facility] 8/13 & this afternoon had a run of asymptomatic NSVT lasting 10beats @ 150bpm. Patient denies lightheadedness, syncope, palps, CP or SOB.  Patient on metoprolol 25mg daily.      Allergies  No Known Allergies    MEDICATIONS:  aspirin enteric coated 81 milliGRAM(s) Oral daily  clopidogrel Tablet 75 milliGRAM(s) Oral daily  metoprolol succinate ER 25 milliGRAM(s) Oral daily  pantoprazole    Tablet 40 milliGRAM(s) Oral before breakfast  atorvastatin 40 milliGRAM(s) Oral at bedtime      PAST MEDICAL & SURGICAL HISTORY:  MI (myocardial infarction): in 1997  Aortic aneurysm  HLD (hyperlipidemia)  Fracture of femur: 1983 &amp; had surgery - Right  Sleep apnea: not  using any Device at present  Knee injury: right  Obesity  H/O gastric bypass: 11/28/2017 @ [facility]  History of appendectomy  H/O knee surgery  H/O hernia repair: sept 2018 @ [facility]  S/P arthroscopy of right knee: March 2015  Elbow pain, right: had surgery  S/P ORIF (open reduction internal fixation) fracture: with ? partial hardware removal, right femur  S/P appendectomy: 1981      FAMILY HISTORY:  Family history of myocardial infarction in first degree male relative      SOCIAL HISTORY:    No toxic habits      REVIEW OF SYSTEMS:  See HPI. Otherwise, 10 point ROS done and otherwise negative.    PHYSICAL EXAM:  T(C): 36.5 (08-14-20 @ 12:37), Max: 36.7 (08-13-20 @ 20:58)  HR: 61 (08-14-20 @ 12:37) (52 - 85)  BP: 121/81 (08-14-20 @ 12:37) (102/64 - 121/81)  RR: 18 (08-14-20 @ 12:37) (16 - 18)  SpO2: 98% (08-14-20 @ 12:37) (97% - 99%)  Wt(kg): --  I&O's Summary    13 Aug 2020 07:01  -  14 Aug 2020 07:00  --------------------------------------------------------  IN: 360 mL / OUT: 750 mL / NET: -390 mL    14 Aug 2020 07:01  -  14 Aug 2020 15:38  --------------------------------------------------------  IN: 240 mL / OUT: 0 mL / NET: 240 mL        Appearance: Alert. NAD	  HEENT:   NC/AT	  Cardiovascular: +S1S2 RRR no m/g/r  Respiratory: CTA B/L	  Psychiatry: A & O x 3, Mood & affect appropriate  Gastrointestinal:  Soft, NT.ND., + BS	  Skin: No rashes	  Neurologic: Non-focal  Extremities: No edema BLE  Vascular: Peripheral pulses palpable 2+ bilaterally      LABS:	 	    CBC Full  -  ( 13 Aug 2020 15:49 )  WBC Count : 7.14 K/uL  Hemoglobin : 14.0 g/dL  Hematocrit : 42.3 %  Platelet Count - Automated : 147 K/uL  Mean Cell Volume : 93.0 fl  Mean Cell Hemoglobin : 30.8 pg  Mean Cell Hemoglobin Concentration : 33.1 gm/dL    08-13    141  |  102  |  17  ----------------------------<  84  4.7   |  27  |  0.93    Ca    10.1      13 Aug 2020 15:49    TELEMETRY: SR 50-80's bpm; 10beats of NSVT @ 150bpm      PREVIOUS DIAGNOSTIC TESTING:    Echocardiogram(5/2019):  NL LVEF; no significant valvular abnormalities    Echo(8/14/2020):  Conclusions:  1. Mild/borderline  aortic root dilatation  (Ao: 4.4 cm at  the sinuses of Valsalva) for BSA of 2.4m2  2. Endocardial visualization enhanced with intravenous  injection of Ultrasonic Enhancing Agent (Definity). Normal  left ventricular systolic function. No segmental wall  motion abnormalities.  3. Normal right ventricular size and function.  4. Estimated pulmonary artery systolic pressure equals 29  mm Hg, assuming right atrial pressure equals 8  mm Hg,  consistent with normal pulmonary pressures.  *** No previous Echo exam.        Catheterization(8/13/2020):  CORONARY VESSELS: The coronary circulation is right dominant.  LM:   --  LM: Normal.  LAD:   --  Mid LAD: There was a 80 % stenosis.  CX:   --  Circumflex: Normal.  RCA:   --  RCA: Normal.  COMPLICATIONS: There were no complications.  DIAGNOSTIC RECOMMENDATIONS: 1. Successful PCI to mid LAD lesion  2. Continue DAPT, high intensity statin and continue BP control.  INTERVENTIONAL RECOMMENDATIONS: 1. Successful PCI to mid LAD lesion  2. Continue DAPT, high intensity statin and continue BP control.  	  ASSESSMENT/PLAN: 	  55y/o male h/o HTN, HLD, s/p prior bariatric surgery, NL LVEF from Echo presents for elective cath s/p PCI to mLAD 8/13 & this afternoon had a run of asymptomatic NSVT lasting 10beats @ 150bpm.  1. CAD s/p PCI to mLAD 8/13  2. NL EF  3. HTN  4. Asymptomatic NSVT (10beats @ ~150bpm)    --In setting of normal LVEF, would continue bblockers for asymptomatic NSVT. No further EP recommendations. EP will sign off. Reconsult prn  --Discussed with Dr. Yoel Ramirez, PA-C  [id number]

## 2020-08-14 NOTE — DISCHARGE NOTE PROVIDER - HOSPITAL COURSE
This is a 55y/o  male with Covid 19 negative swab from 20 , no implantable devices noted with PMHX of MI in , HLD, Aortic aneurysm, Obesity, +former tobacco smoker , hx gastric bypass  , Family hx MI (pt father  from MI) and Sleep Apnea non-compliant with CPAP. PT presents with recent complaints of " indigestion" after eating sauce. Pt was in [place] and went to PMD who referred to hospital and pt walked out " too crowded" call Physician in [place] and  went to Dr. Saha and had CT of heart that was abnormal in 2020 . [place] returned [place] from [place] on 8/10/20 . Now presents today for Scheduled LHC with Dr. Harrison  . Currently Cp free no sob no palpitations no lightheadedness or dizziness noted.     7/15/20 CT Heart= The left main coronary artery is very short and gives rise to the left anterior descending artery and left circumflex artery. There is a large amount of calcified plaque in the left main resulting in moderate stenosis 50-69% LAD has a large amount of predominately calcified Plaque in the proximal LAD, resulting in Severe Stenosis 70-99% Large amount of plaque in left CX moderate stenosis. RCA mild stenosis of the proximal RCA 50-69% stenosis. Calcium score is 1625         < from: Cardiac Cath Lab - Adult (20 @ 16:34) >        PROCEDURE:    --  Left coronary angiography.    --  Right coronary angiography.    --  Intervention on mid LAD: drug-eluting stent.   A successful drug-eluting stent was performed on the 80 % lesion in the mid LAD. Following intervention there    was a 1 % residual stenosis.        1. Successful PCI to mid LAD lesion    2. Continue DAPT, high intensity statin and continue BP control.        To be discharged home today: asa, plavix, lipitor 40mg, toprol xl 25 qd patient to f/u with primary cardiologist  Dr. Saha in 1-2 weeks This is a 55y/o  male with Covid 19 negative swab from 20 , no implantable devices noted with PMHX of MI in , HLD, Aortic aneurysm, Obesity, +former tobacco smoker , hx gastric bypass  , Family hx MI (pt father  from MI) and Sleep Apnea non-compliant with CPAP. PT presents with recent complaints of " indigestion" after eating sauce. Pt was in [place] and went to PMD who referred to hospital and pt walked out " too crowded" call Physician in [place] and  went to Dr. Saha and had CT of heart that was abnormal in 2020 . [place] returned [place] from [place] on 8/10/20 . Now presents today for Scheduled LHC with Dr. Harrison  . Currently Cp free no sob no palpitations no lightheadedness or dizziness noted.     7/15/20 CT Heart= The left main coronary artery is very short and gives rise to the left anterior descending artery and left circumflex artery. There is a large amount of calcified plaque in the left main resulting in moderate stenosis 50-69% LAD has a large amount of predominately calcified Plaque in the proximal LAD, resulting in Severe Stenosis 70-99% Large amount of plaque in left CX moderate stenosis. RCA mild stenosis of the proximal RCA 50-69% stenosis. Calcium score is 1625     PROCEDURE:    --  Left coronary angiography on 20    --  Intervention on mid LAD: drug-eluting stent.   A successful drug-eluting stent was performed on the 80 % lesion in the mid LAD. Following intervention there    was a 1 % residual stenosis - Successful PCI to mid LAD lesion. Continue DAPT, high intensity statin and continue BP control. Discharged on asa, plavix, lipitor 40mg, toprol xl 25 qd patient to f/u with primary cardiologist  Dr. Saha in 1-2 weeks    Pt  had a run of asymptomatic NSVT lasting 10beats @ 150bpm. Echo with normal EF    Asymptomatic NSVT (10beats @ ~150bpm). EP consulted in setting of normal LVEF, would continue bblockers for asymptomatic NSVT. No further EP recommendations.

## 2020-08-14 NOTE — DISCHARGE NOTE PROVIDER - NSDCCPCAREPLAN_GEN_ALL_CORE_FT
PRINCIPAL DISCHARGE DIAGNOSIS  Diagnosis: Coronary stent patent  Assessment and Plan of Treatment: Continue your medications. Do not stop Aspirin or Plavix unless instructed by your cardiologist.  Note your Lipitor dose increased to 40mg oral at bedtime (1 x day)  No heavy lifting or pushing/pulling or strenuous activity with procedure arm for 2 weeks. No driving for 2 days. No sex for 1 week.  You may shower 24 hours following procedure but no soaking of your wrist in water (such as in a pool, sink, or tub) for 1 week. Check wrist site for bleeding and/or swelling daily following procedure. Call your doctor/cardiologist immediately for bleeding or swelling or if you have increased/persistent pain or drainage at the wrist site or if you have numbness, tingling or blue or white coloring of your hand or fingers.  Follow up with your cardiologist in 1- 2 weeks. You may call [facility] at [phone] or [phone] after office hours and weekends with any questions or concerns following your procedure. PRINCIPAL DISCHARGE DIAGNOSIS  Diagnosis: Coronary stent patent  Assessment and Plan of Treatment: Continue your medications. Do not stop Aspirin or Plavix unless instructed by your cardiologist.  Note your Lipitor dose increased to 40mg oral at bedtime (1 x day)  No heavy lifting or pushing/pulling or strenuous activity with procedure arm for 2 weeks. No driving for 2 days. No sex for 1 week.  You may shower 24 hours following procedure but no soaking of your wrist in water (such as in a pool, sink, or tub) for 1 week. Check wrist site for bleeding and/or swelling daily following procedure. Call your doctor/cardiologist immediately for bleeding or swelling or if you have increased/persistent pain or drainage at the wrist site or if you have numbness, tingling or blue or white coloring of your hand or fingers.  Follow up with your cardiologist in 1- 2 weeks. You may call [facility] at [phone] or [phone] after office hours and weekends with any questions or concerns following your procedure. PRINCIPAL DISCHARGE DIAGNOSIS  Diagnosis: Coronary stent patent  Assessment and Plan of Treatment: Continue your medications. Do not stop Aspirin or Plavix unless instructed by your cardiologist.  Note your Lipitor dose increased to 40mg oral at bedtime (1 x day)  No heavy lifting or pushing/pulling or strenuous activity with procedure arm for 2 weeks. No driving for 2 days. No sex for 1 week.  You may shower 24 hours following procedure but no soaking of your wrist in water (such as in a pool, sink, or tub) for 1 week. Check wrist site for bleeding and/or swelling daily following procedure. Call your doctor/cardiologist immediately for bleeding or swelling or if you have increased/persistent pain or drainage at the wrist site or if you have numbness, tingling or blue or white coloring of your hand or fingers.  Follow up with your cardiologist in 1- 2 weeks. You may call [facility] at [phone] or [phone] after office hours and weekends with any questions or concerns following your procedure.        SECONDARY DISCHARGE DIAGNOSES  Diagnosis: NSVT (nonsustained ventricular tachycardia)  Assessment and Plan of Treatment: Pt  had a run of asymptomatic NSVT lasting 10beats @ 150bpm.   Echo with normal EF  Asymptomatic NSVT (10beats @ ~150bpm).   EP consulted in setting of normal LVEF, would continue bblockers for asymptomatic NSVT. No further EP recommendations.

## 2020-08-14 NOTE — DISCHARGE NOTE NURSING/CASE MANAGEMENT/SOCIAL WORK - PATIENT PORTAL LINK FT
You can access the FollowMyHealth Patient Portal offered by [facility] by registering at the following website: [email or web address]. By joining [facility]’s FollowMyHealth portal, you will also be able to view your health information using other applications (apps) compatible with our system.

## 2020-08-14 NOTE — CHART NOTE - NSCHARTNOTEFT_GEN_A_CORE
Patient had 10 beats of WCT - pt asymptomatic - pt was resting OOB to chair. Denies palpitation, SOB, chest pain, dizziness, N/V    Vital Signs Last 24 Hrs  T(C): 36.5 (14 Aug 2020 12:37), Max: 37 (13 Aug 2020 15:05)  T(F): 97.7 (14 Aug 2020 12:37), Max: 98.6 (13 Aug 2020 15:05)  HR: 61 (14 Aug 2020 12:37) (52 - 85)  BP: 121/81 (14 Aug 2020 12:37) (102/64 - 132/84)  BP(mean): 100 (13 Aug 2020 15:26) (100 - 100)  RR: 18 (14 Aug 2020 12:37) (16 - 18)  SpO2: 98% (14 Aug 2020 12:37) (95% - 100%)                          14.0   7.14  )-----------( 147      ( 13 Aug 2020 15:49 )             42.3     08-13    141  |  102  |  17  ----------------------------<  84  4.7   |  27  |  0.93    Ca    10.1      13 Aug 2020 15:49    Pt s/p left heart cath with JENNIFER to mLAD on 8/13 and today with 10beats of WCT. Discussed with cardiology - and per rec called EP consult and Echo ordered. Discharge cancelled    [id number]

## 2020-08-14 NOTE — CHART NOTE - NSCHARTNOTEFT_GEN_A_CORE
Evaluated by EP for run of asymptomatic NSVT lasting 10beats @ 150bpm.. Echo showed normal LVFx. In setting of normal LVEF, EP rec to continue bblockers for asymptomatic NSVT and no further EP recommendations. Discussed with Dr. Corey and cleared pt for discharge. Dr. Saha (pt's cards) updated of pt's status.     [id number]

## 2020-08-14 NOTE — CHART NOTE - NSCHARTNOTEFT_GEN_A_CORE
Cleared by cardiology fellow to be discharged home (aware of HR down to mid 40'sduring sleep, asymptomatic). HR on tele is SR now. Right wrist access site is benign, no redness, denies pain, no neurovascular compromise. Request from Dr. Lockhart to facilitate patient discharge. Medication reconciliation reviewed, revised, and resolved with  who had medically cleared patient for discharge with follow-up as advised. Please refer to discharge note for detailed hospital course. Patient is currently stable for discharge to home at this time.    Contact # [phone]

## 2020-08-14 NOTE — DISCHARGE NOTE PROVIDER - NSDCMRMEDTOKEN_GEN_ALL_CORE_FT
Aspir 81 oral delayed release tablet: 1 tab(s) orally once a day  atorvastatin 40 mg oral tablet: 1 tab(s) orally once a day (at bedtime)  clopidogrel 75 mg oral tablet: 1 tab(s) orally once a day -for allergy symptoms   metoprolol succinate 25 mg oral tablet, extended release: 1 tab(s) orally once a day  omeprazole 20 mg oral delayed release tablet: 1 tab(s) orally once a day

## 2020-08-14 NOTE — DISCHARGE NOTE PROVIDER - NSDCCPTREATMENT_GEN_ALL_CORE_FT
PRINCIPAL PROCEDURE  Procedure: Left heart cardiac cath  Findings and Treatment: Catheterization(8/13/2020):  CORONARY VESSELS: The coronary circulation is right dominant.  LM:   --  LM: Normal.  LAD:   --  Mid LAD: There was a 80 % stenosis.  CX:   --  Circumflex: Normal.  RCA:   --  RCA: Normal.  COMPLICATIONS: There were no complications.  DIAGNOSTIC RECOMMENDATIONS: 1. Successful PCI to mid LAD lesion  2. Continue DAPT, high intensity statin and continue BP control.  INTERVENTIONAL RECOMMENDATIONS: 1. Successful PCI to mid LAD lesion  2. Continue DAPT, high intensity statin and continue BP control.  	  ASSESSMENT/PLAN: 	  53y/o male h/o HTN, HLD, s/p prior bariatric surgery, NL LVEF from Echo presents for elective cath s/p PCI to mLAD 8/13 & this afternoon had a run of asymptomatic NSVT lasting 10beats @ 150bpm.  1. CAD s/p PCI to mLAD 8/13  2. NL EF  3. HTN  4. Asymptomatic NSVT (10beats @ ~150bpm)  --In setting of normal LVEF, would continue bblockers for asymptomatic NSVT. No further EP recommendations.      SECONDARY PROCEDURE  Procedure: Transthoracic echo  Findings and Treatment: Echo(8/14/2020):  Conclusions:  1. Mild/borderline  aortic root dilatation  (Ao: 4.4 cm at  the sinuses of Valsalva) for BSA of 2.4m2  2. Endocardial visualization enhanced with intravenous  injection of Ultrasonic Enhancing Agent (Definity). Normal  left ventricular systolic function. No segmental wall  motion abnormalities.  3. Normal right ventricular size and function.  4. Estimated pulmonary artery systolic pressure equals 29  mm Hg, assuming right atrial pressure equals 8  mm Hg,  consistent with normal pulmonary pressures.

## 2020-08-14 NOTE — PROGRESS NOTE ADULT - SUBJECTIVE AND OBJECTIVE BOX
Patient seen and evaluated at bedside    HPI:  53y/o male PMHX of MI in 1996, HLD, Aortic aneurysm, Obesity, +former tobacco smoker, Family hx MI, Sleep Apnea non-compliant with CPAP presents with atypical chest pain, was in [place] and went to PMD who referred to hospital, called Physician in [place], underwent CTA showing proximal LAD disease, scheduled LHC with Dr. Harrison now s/p JENNIFER x 1 to mLAD via RRA, tolerated well without chest discomfort or cardiopulmonary sxs overnight, R radial access site without hematoma or complication.     PMHx:   MI (myocardial infarction)  Aortic aneurysm  HLD (hyperlipidemia)  Fracture of femur  Sleep apnea  Knee injury  Obesity      PSHx:   H/O gastric bypass  History of appendectomy  H/O knee surgery  H/O hernia repair  S/P arthroscopy of right knee  Elbow pain, right  S/P ORIF (open reduction internal fixation) fracture  S/P appendectomy    Allergies:  No Known Allergies    Current Medications:   aspirin enteric coated 81 milliGRAM(s) Oral daily  atorvastatin 40 milliGRAM(s) Oral at bedtime  clopidogrel Tablet 75 milliGRAM(s) Oral daily  metoprolol succinate ER 25 milliGRAM(s) Oral daily  pantoprazole    Tablet 40 milliGRAM(s) Oral before breakfast    FAMILY HISTORY:  Family history of myocardial infarction in first degree male relative    Physical Exam:  T(F): 97.7 (08-14), Max: 98.6 (08-13)  HR: 61 (08-14) (52 - 85)  BP: 121/81 (08-14) (102/64 - 132/84)  RR: 18 (08-14)  SpO2: 98% (08-14)  GENERAL: No acute distress, well-developed  CHEST/LUNG: Clear to auscultation bilaterally; No wheeze, equal breath sounds bilaterally   HEART: Regular rate and rhythm; No murmurs, rubs, or gallops  ABDOMEN: Soft, Nontender, Nondistended; Bowel sounds present  EXTREMITIES:  No clubbing, cyanosis, or edema, r radial w/o hematoma or evidence of complication     ECG: Personally reviewed:    Labs: Personally reviewed                        14.0   7.14  )-----------( 147      ( 13 Aug 2020 15:49 )             42.3     08-13    141  |  102  |  17  ----------------------------<  84  4.7   |  27  |  0.93    Ca    10.1      13 Aug 2020 15:49      53y/o male PMHX of MI in 1996, HLD, Aortic aneurysm, Obesity, +former tobacco smoker, Family hx MI, Sleep Apnea non-compliant with CPAP presents with atypical chest pain, was in [place] and went to PMD who referred to hospital, called Physician in [place], underwent CTA showing proximal LAD disease, scheduled LHC with Dr. Harrison now s/p JENNIFER x 1 to mLAD via RRA, tolerated well without chest discomfort or cardiopulmonary sxs overnight, R radial access site without hematoma or complication.     - cleared for discharge from a procedural perspective   - continue aspirin, plavix, and high intensity statin   - follow up with outpatient cardiologist within 1 week

## 2020-08-14 NOTE — DISCHARGE NOTE PROVIDER - CARE PROVIDER_API CALL
ARVIND ALVARADO  CARDIOLOGY  [address] [place] [address]  Phone: [phone]  Fax: [phone]  Follow Up Time: 1 week

## 2020-08-17 NOTE — PHYSICAL EXAM
[General Appearance - Well Developed] : well developed [Well Groomed] : well groomed [Normal Appearance] : normal appearance [General Appearance - Well Nourished] : well nourished [Normal Conjunctiva] : the conjunctiva exhibited no abnormalities [No Deformities] : no deformities [General Appearance - In No Acute Distress] : no acute distress [Eyelids - No Xanthelasma] : the eyelids demonstrated no xanthelasmas [Normal Oral Mucosa] : normal oral mucosa [No Oral Cyanosis] : no oral cyanosis [No Oral Pallor] : no oral pallor [Normal Jugular Venous A Waves Present] : normal jugular venous A waves present [Normal Jugular Venous V Waves Present] : normal jugular venous V waves present [No Jugular Venous Walls A Waves] : no jugular venous walls A waves [Respiration, Rhythm And Depth] : normal respiratory rhythm and effort [Exaggerated Use Of Accessory Muscles For Inspiration] : no accessory muscle use [Auscultation Breath Sounds / Voice Sounds] : lungs were clear to auscultation bilaterally [Abdomen Soft] : soft [Abdomen Tenderness] : non-tender [Abdomen Mass (___ Cm)] : no abdominal mass palpated [Abnormal Walk] : normal gait [Nail Clubbing] : no clubbing of the fingernails [Gait - Sufficient For Exercise Testing] : the gait was sufficient for exercise testing [Cyanosis, Localized] : no localized cyanosis [Petechial Hemorrhages (___cm)] : no petechial hemorrhages [] : no rash [Skin Color & Pigmentation] : normal skin color and pigmentation [No Venous Stasis] : no venous stasis [Skin Lesions] : no skin lesions [No Xanthoma] : no  xanthoma was observed [Oriented To Time, Place, And Person] : oriented to person, place, and time [No Skin Ulcers] : no skin ulcer [No Anxiety] : not feeling anxious [Affect] : the affect was normal [Mood] : the mood was normal [FreeTextEntry1] : Regular rate and rhythm, NL S1, S2, non-displaced PMI, chest non-tender; no rubs,heaves  or gallops a  Grade 1/6 systolic murmur noted at the LSB\par

## 2020-08-17 NOTE — HISTORY OF PRESENT ILLNESS
[FreeTextEntry1] : The patient presents for routine follow up of their coronary artery disease. pt has recent CT  that shows worsening CAD  Left Main 50-69..   The patient has been following all secondary prevents measures and antiplatelet therapy. The patient denies shortness of breath, chest pain, dizziness, palpitations but he has not been exerting himself.  he had a CT coronary angiogram several years ago which showed subcritical disease.  The patient was in the hospital in [place] about to have cardiac catheterization but decided to leave because he felt that there was too much risk because of COVID-19.  The patient has returned from [place] to [place] to get ongoing medical care.\par

## 2020-08-20 ENCOUNTER — NON-APPOINTMENT (OUTPATIENT)
Age: 55
End: 2020-08-20

## 2020-08-20 ENCOUNTER — APPOINTMENT (OUTPATIENT)
Dept: CARDIOLOGY | Facility: CLINIC | Age: 55
End: 2020-08-20
Payer: COMMERCIAL

## 2020-08-20 VITALS — DIASTOLIC BLOOD PRESSURE: 78 MMHG | OXYGEN SATURATION: 97 % | SYSTOLIC BLOOD PRESSURE: 118 MMHG | HEART RATE: 53 BPM

## 2020-08-20 DIAGNOSIS — I71.2 THORACIC AORTIC ANEURYSM, W/OUT RUPTURE: ICD-10-CM

## 2020-08-20 DIAGNOSIS — K21.9 GASTRO-ESOPHAGEAL REFLUX DISEASE W/OUT ESOPHAGITIS: ICD-10-CM

## 2020-08-20 PROCEDURE — 93000 ELECTROCARDIOGRAM COMPLETE: CPT

## 2020-08-20 PROCEDURE — 99214 OFFICE O/P EST MOD 30 MIN: CPT

## 2020-08-20 RX ORDER — PANTOPRAZOLE 20 MG/1
20 TABLET, DELAYED RELEASE ORAL DAILY
Qty: 90 | Refills: 1 | Status: ACTIVE | COMMUNITY
Start: 2020-08-20 | End: 1900-01-01

## 2020-08-20 NOTE — PHYSICAL EXAM
[General Appearance - Well Developed] : well developed [Normal Appearance] : normal appearance [Well Groomed] : well groomed [General Appearance - Well Nourished] : well nourished [Normal Conjunctiva] : the conjunctiva exhibited no abnormalities [No Deformities] : no deformities [General Appearance - In No Acute Distress] : no acute distress [Normal Oral Mucosa] : normal oral mucosa [Eyelids - No Xanthelasma] : the eyelids demonstrated no xanthelasmas [No Oral Pallor] : no oral pallor [Normal Jugular Venous A Waves Present] : normal jugular venous A waves present [No Oral Cyanosis] : no oral cyanosis [Normal Jugular Venous V Waves Present] : normal jugular venous V waves present [No Jugular Venous Walls A Waves] : no jugular venous walls A waves [Respiration, Rhythm And Depth] : normal respiratory rhythm and effort [Exaggerated Use Of Accessory Muscles For Inspiration] : no accessory muscle use [Heart Rate And Rhythm] : heart rate and rhythm were normal [Auscultation Breath Sounds / Voice Sounds] : lungs were clear to auscultation bilaterally [Murmurs] : no murmurs present [Heart Sounds] : normal S1 and S2 [Abdomen Soft] : soft [Abdomen Tenderness] : non-tender [Abnormal Walk] : normal gait [Abdomen Mass (___ Cm)] : no abdominal mass palpated [Nail Clubbing] : no clubbing of the fingernails [Gait - Sufficient For Exercise Testing] : the gait was sufficient for exercise testing [Cyanosis, Localized] : no localized cyanosis [Petechial Hemorrhages (___cm)] : no petechial hemorrhages [] : no rash [No Venous Stasis] : no venous stasis [Skin Color & Pigmentation] : normal skin color and pigmentation [No Xanthoma] : no  xanthoma was observed [No Skin Ulcers] : no skin ulcer [Skin Lesions] : no skin lesions [Oriented To Time, Place, And Person] : oriented to person, place, and time [Affect] : the affect was normal [Mood] : the mood was normal [No Anxiety] : not feeling anxious

## 2020-08-21 LAB
25(OH)D3 SERPL-MCNC: 60.5 NG/ML
ALBUMIN SERPL ELPH-MCNC: 4.9 G/DL
ALP BLD-CCNC: 73 U/L
ALT SERPL-CCNC: 29 U/L
ANION GAP SERPL CALC-SCNC: 14 MMOL/L
ANION GAP SERPL CALC-SCNC: 14 MMOL/L
AST SERPL-CCNC: 24 U/L
BASOPHILS # BLD AUTO: 0.05 K/UL
BASOPHILS NFR BLD AUTO: 0.7 %
BILIRUB DIRECT SERPL-MCNC: 0.2 MG/DL
BILIRUB INDIRECT SERPL-MCNC: 1.1 MG/DL
BILIRUB SERPL-MCNC: 1.3 MG/DL
BUN SERPL-MCNC: 21 MG/DL
BUN SERPL-MCNC: 21 MG/DL
CALCIUM SERPL-MCNC: 10.1 MG/DL
CALCIUM SERPL-MCNC: 10.2 MG/DL
CHLORIDE SERPL-SCNC: 103 MMOL/L
CHLORIDE SERPL-SCNC: 104 MMOL/L
CHOLEST SERPL-MCNC: 151 MG/DL
CHOLEST/HDLC SERPL: 3.1 RATIO
CK SERPL-CCNC: 193 U/L
CO2 SERPL-SCNC: 26 MMOL/L
CO2 SERPL-SCNC: 27 MMOL/L
CREAT SERPL-MCNC: 1.02 MG/DL
CREAT SERPL-MCNC: 1.03 MG/DL
EOSINOPHIL # BLD AUTO: 0.23 K/UL
EOSINOPHIL NFR BLD AUTO: 3.2 %
ESTIMATED AVERAGE GLUCOSE: 105 MG/DL
FOLATE SERPL-MCNC: 16.9 NG/ML
GLUCOSE SERPL-MCNC: 77 MG/DL
GLUCOSE SERPL-MCNC: 79 MG/DL
HBA1C MFR BLD HPLC: 5.3 %
HCT VFR BLD CALC: 47.6 %
HDLC SERPL-MCNC: 49 MG/DL
HGB BLD-MCNC: 15.1 G/DL
IMM GRANULOCYTES NFR BLD AUTO: 0.4 %
LDLC SERPL CALC-MCNC: 71 MG/DL
LDLC SERPL DIRECT ASSAY-MCNC: 84 MG/DL
LYMPHOCYTES # BLD AUTO: 1.66 K/UL
LYMPHOCYTES NFR BLD AUTO: 22.9 %
MAN DIFF?: NORMAL
MCHC RBC-ENTMCNC: 30.3 PG
MCHC RBC-ENTMCNC: 31.7 GM/DL
MCV RBC AUTO: 95.4 FL
MONOCYTES # BLD AUTO: 0.66 K/UL
MONOCYTES NFR BLD AUTO: 9.1 %
NEUTROPHILS # BLD AUTO: 4.62 K/UL
NEUTROPHILS NFR BLD AUTO: 63.7 %
PLATELET # BLD AUTO: 201 K/UL
POTASSIUM SERPL-SCNC: 5.1 MMOL/L
POTASSIUM SERPL-SCNC: 5.1 MMOL/L
PROT SERPL-MCNC: 7.6 G/DL
RBC # BLD: 4.99 M/UL
RBC # FLD: 12.1 %
SODIUM SERPL-SCNC: 143 MMOL/L
SODIUM SERPL-SCNC: 144 MMOL/L
TRIGL SERPL-MCNC: 154 MG/DL
VIT B12 SERPL-MCNC: >2000 PG/ML
WBC # FLD AUTO: 7.25 K/UL

## 2020-08-26 NOTE — HISTORY OF PRESENT ILLNESS
[FreeTextEntry1] : The patient presents for routine follow up of their coronary artery disease.   The patient has been following all secondary prevents measures and antiplatelet therapy. The patient denies shortness of breath, chest pain, dizziness, palpitations. The patient was in the hospital in [place] about to have cardiac catheterization but decided to leave because he felt that there was too much risk because of COVID-19. The patient has returned from [place] to [place] to get ongoing medical care. \par s/p cath 08/13 with stent to LAD\par The patient presents for evaluation of high blood pressure. Patient is currently tolerating the current  antihypertensive regime and they deny headaches, stiff neck, visual changes, pedal Edema or PND. They also are here for follow-up of elevated cholesterol. Patient is currently tolerating medication and denies muscle pain, joint pain, back pain, tea colored urine ,nausea, vomiting, abdominal pain or diarrhea. The patient is trying to follow a low cholesterol diet.\par The patient presents today with complaints of heartburn.  They state the heartburn is worse mostly when laying down at night but can occur at other times especially with certain foods. They deny nausea, vomiting, chest pain, difficulty breathing or swallowing. Patient has occasionally taken an ant- acids for this complaint with some relief.\par \par

## 2020-09-23 ENCOUNTER — APPOINTMENT (OUTPATIENT)
Dept: CARDIOLOGY | Facility: CLINIC | Age: 55
End: 2020-09-23
Payer: COMMERCIAL

## 2020-09-23 VITALS
TEMPERATURE: 98.3 F | OXYGEN SATURATION: 97 % | HEART RATE: 57 BPM | DIASTOLIC BLOOD PRESSURE: 70 MMHG | SYSTOLIC BLOOD PRESSURE: 106 MMHG

## 2020-09-23 DIAGNOSIS — I10 ESSENTIAL (PRIMARY) HYPERTENSION: ICD-10-CM

## 2020-09-23 DIAGNOSIS — Z23 ENCOUNTER FOR IMMUNIZATION: ICD-10-CM

## 2020-09-23 DIAGNOSIS — R01.1 CARDIAC MURMUR, UNSPECIFIED: ICD-10-CM

## 2020-09-23 DIAGNOSIS — I25.10 ATHEROSCLEROTIC HEART DISEASE OF NATIVE CORONARY ARTERY W/OUT ANGINA PECTORIS: ICD-10-CM

## 2020-09-23 DIAGNOSIS — E78.5 HYPERLIPIDEMIA, UNSPECIFIED: ICD-10-CM

## 2020-09-23 PROCEDURE — 93306 TTE W/DOPPLER COMPLETE: CPT

## 2020-09-23 PROCEDURE — 99214 OFFICE O/P EST MOD 30 MIN: CPT | Mod: 25

## 2020-09-23 RX ORDER — CLOPIDOGREL BISULFATE 75 MG/1
75 TABLET, FILM COATED ORAL
Qty: 90 | Refills: 1 | Status: ACTIVE | COMMUNITY
Start: 2020-08-20 | End: 1900-01-01

## 2020-09-23 RX ORDER — ATORVASTATIN CALCIUM 40 MG/1
40 TABLET, FILM COATED ORAL
Qty: 90 | Refills: 1 | Status: ACTIVE | COMMUNITY
Start: 2019-10-02 | End: 1900-01-01

## 2020-09-23 RX ORDER — EZETIMIBE 10 MG/1
10 TABLET ORAL DAILY
Qty: 90 | Refills: 1 | Status: ACTIVE | COMMUNITY
Start: 2020-09-23 | End: 1900-01-01

## 2020-09-23 RX ORDER — METOPROLOL SUCCINATE 25 MG/1
25 TABLET, EXTENDED RELEASE ORAL
Qty: 90 | Refills: 1 | Status: ACTIVE | COMMUNITY
Start: 2020-08-06 | End: 1900-01-01

## 2020-09-30 NOTE — HISTORY OF PRESENT ILLNESS
[FreeTextEntry1] : The patient presents for evaluation of high blood pressure. Patient is currently tolerating the current  antihypertensive regime and they deny headaches, stiff neck, visual changes, pedal Edema or PND. They also are here for follow-up of elevated cholesterol. Patient is currently tolerating medication and and does not complain of new  muscle pain, joint pain, back pain,urinary changes ,nausea, vomiting, abdominal pain or diarrhea. The patient is trying to follow a low cholesterol diet. \par The patient presents for routine follow up of their coronary artery disease.   The patient has been following all secondary prevents measures and antiplatelet therapy. The patient denies shortness of breath, chest pain, dizziness, palpitations.

## 2020-12-15 NOTE — ED ADULT TRIAGE NOTE - AS TEMP SITE
oral Area H Indication Text: Tumors in this location are included in Area H (eyelids, eyebrows, nose, lips, chin, ear, pre-auricular, post-auricular, temple, genitalia, hands, feet, ankles and areola).  Tissue conservation is critical in these anatomic locations.

## 2023-06-12 ENCOUNTER — APPOINTMENT (OUTPATIENT)
Dept: PULMONOLOGY | Facility: CLINIC | Age: 58
End: 2023-06-12
Payer: COMMERCIAL

## 2023-06-12 ENCOUNTER — APPOINTMENT (OUTPATIENT)
Dept: PULMONOLOGY | Facility: CLINIC | Age: 58
End: 2023-06-12

## 2023-06-12 VITALS
OXYGEN SATURATION: 96 % | HEART RATE: 72 BPM | SYSTOLIC BLOOD PRESSURE: 116 MMHG | BODY MASS INDEX: 34.67 KG/M2 | DIASTOLIC BLOOD PRESSURE: 80 MMHG | WEIGHT: 270 LBS

## 2023-06-12 DIAGNOSIS — R06.02 SHORTNESS OF BREATH: ICD-10-CM

## 2023-06-12 DIAGNOSIS — Z98.84 BARIATRIC SURGERY STATUS: ICD-10-CM

## 2023-06-12 DIAGNOSIS — R59.0 LOCALIZED ENLARGED LYMPH NODES: ICD-10-CM

## 2023-06-12 LAB — POCT - HEMOGLOBIN (HGB), QUANTITATIVE, TRANSCUTANEOUS: 18.6

## 2023-06-12 PROCEDURE — 94010 BREATHING CAPACITY TEST: CPT

## 2023-06-12 PROCEDURE — 99214 OFFICE O/P EST MOD 30 MIN: CPT | Mod: 25

## 2023-06-12 PROCEDURE — ZZZZZ: CPT

## 2023-06-12 PROCEDURE — 88738 HGB QUANT TRANSCUTANEOUS: CPT

## 2023-06-12 PROCEDURE — 94729 DIFFUSING CAPACITY: CPT

## 2023-06-12 PROCEDURE — 94727 GAS DIL/WSHOT DETER LNG VOL: CPT

## 2023-06-13 NOTE — HISTORY OF PRESENT ILLNESS
[TextBox_4] : KARLI ESPINOZA is a 57 year old male, with history of Obstructive sleep apnea, who presents to the office for follow up evaluation. Patient reports of having to wheeze after going up an incline. Patient reports of receiving an abnormal MRI which indicated abnormal thoracic lymph.  And states that he would like to receive a second opinion about it. \par He has a history of obstructive sleep apnea.  He recently received replacement DreamStation device he was unable to use it because he thought the pressures were too high.  He had lost a significant amount of weight after his initial diagnosis having undergone bariatric surgery.  He has regained about 40 pounds of the 100 pounds he lost.  He still reports nonrestorative sleep.  He does report occasional shortness of breath with exertion\par \par \par

## 2023-06-13 NOTE — REASON FOR VISIT
[Follow-Up] : a follow-up visit [Sleep Apnea] : sleep apnea [Abnormal CXR/ Chest CT] : an abnormal CXR/ chest CT [TextBox_44] : Abnormal MRI

## 2023-06-13 NOTE — PROCEDURE
[FreeTextEntry1] : PFT indicates interval decline compared to prior.\par \par Chest CT reviewed no evidence of parenchymal lung disease.  Nonspecific adenopathy noted\par \par Prior sleep study 2014 retrieved severe SAUD

## 2023-06-13 NOTE — ADDENDUM
[FreeTextEntry1] : I, Brandon Jenningsregla, acted solely as a scribe for Dr. Balaji Jim M.D. on this date 06/12/2023. \par \par All medical record entries made by the Scribe were at my, Dr. Balaji Jim M.D., direction and personally dictated by me on 06/12/2023. I have reviewed the chart and agree that the record accurately reflects my personal performance of the history, physical exam, assessment and plan. I have also personally directed, reviewed, and agreed with the chart.

## 2023-06-13 NOTE — ASSESSMENT
[FreeTextEntry1] : Decreased lung function indicated in recent pulmonary function test which is abnormal as he has recently lost a significant amount of weight and he appears to be asymptomatic.  Trial of Trelegy to determine reversibility.  We will provide a sample of Trelegy 200.\par \par Reviewed recent MRI w/o contrast and chest CT scan which indicated lymph nodes that are likely benign.  Recommend contrast CT for further assessment.\par I have reduced the pressure on his CPAP machine via remote.  We will perform data check in 1 month.\par \par Advised patient to receive sleep study to re-evaluate the severity of his sleep apnea as he has lost a significant amount of weight due to bariatric surgery since his last visit.

## 2023-06-14 ENCOUNTER — APPOINTMENT (OUTPATIENT)
Dept: PULMONOLOGY | Facility: CLINIC | Age: 58
End: 2023-06-14

## 2023-06-16 ENCOUNTER — OUTPATIENT (OUTPATIENT)
Dept: OUTPATIENT SERVICES | Facility: HOSPITAL | Age: 58
LOS: 1 days | End: 2023-06-16
Payer: COMMERCIAL

## 2023-06-16 ENCOUNTER — APPOINTMENT (OUTPATIENT)
Dept: CT IMAGING | Facility: IMAGING CENTER | Age: 58
End: 2023-06-16
Payer: COMMERCIAL

## 2023-06-16 ENCOUNTER — TRANSCRIPTION ENCOUNTER (OUTPATIENT)
Age: 58
End: 2023-06-16

## 2023-06-16 DIAGNOSIS — R59.0 LOCALIZED ENLARGED LYMPH NODES: ICD-10-CM

## 2023-06-16 DIAGNOSIS — Z98.89 OTHER SPECIFIED POSTPROCEDURAL STATES: Chronic | ICD-10-CM

## 2023-06-16 PROCEDURE — 71260 CT THORAX DX C+: CPT | Mod: 26

## 2023-06-16 PROCEDURE — 71260 CT THORAX DX C+: CPT

## 2023-07-05 ENCOUNTER — TRANSCRIPTION ENCOUNTER (OUTPATIENT)
Age: 58
End: 2023-07-05

## 2023-07-12 ENCOUNTER — NON-APPOINTMENT (OUTPATIENT)
Age: 58
End: 2023-07-12

## 2023-08-30 ENCOUNTER — APPOINTMENT (OUTPATIENT)
Dept: PULMONOLOGY | Facility: CLINIC | Age: 58
End: 2023-08-30
Payer: COMMERCIAL

## 2023-08-31 PROCEDURE — 95800 SLP STDY UNATTENDED: CPT | Mod: 52

## 2023-09-01 PROCEDURE — 95800 SLP STDY UNATTENDED: CPT | Mod: 52

## 2023-09-02 PROCEDURE — 95800 SLP STDY UNATTENDED: CPT | Mod: 52

## 2023-09-14 ENCOUNTER — APPOINTMENT (OUTPATIENT)
Dept: PULMONOLOGY | Facility: CLINIC | Age: 58
End: 2023-09-14
Payer: COMMERCIAL

## 2023-09-14 VITALS — SYSTOLIC BLOOD PRESSURE: 146 MMHG | OXYGEN SATURATION: 98 % | DIASTOLIC BLOOD PRESSURE: 87 MMHG | HEART RATE: 90 BPM

## 2023-09-14 DIAGNOSIS — G47.33 OBSTRUCTIVE SLEEP APNEA (ADULT) (PEDIATRIC): ICD-10-CM

## 2023-09-14 DIAGNOSIS — J44.89 OTHER SPECIFIED CHRONIC OBSTRUCTIVE PULMONARY DISEASE: ICD-10-CM

## 2023-09-14 PROCEDURE — 94010 BREATHING CAPACITY TEST: CPT

## 2023-09-14 PROCEDURE — 99213 OFFICE O/P EST LOW 20 MIN: CPT | Mod: 25

## 2023-09-14 RX ORDER — FLUTICASONE FUROATE, UMECLIDINIUM BROMIDE AND VILANTEROL TRIFENATATE 200; 62.5; 25 UG/1; UG/1; UG/1
200-62.5-25 POWDER RESPIRATORY (INHALATION)
Qty: 1 | Refills: 4 | Status: ACTIVE | COMMUNITY
Start: 2023-06-13 | End: 1900-01-01

## 2023-09-15 PROBLEM — G47.33 OSA (OBSTRUCTIVE SLEEP APNEA): Status: ACTIVE | Noted: 2023-06-12

## 2023-10-01 PROBLEM — J44.89 OTHER SPECIFIED CHRONIC OBSTRUCTIVE AIRWAYS DISEASE: Status: ACTIVE | Noted: 2023-09-15

## 2024-06-14 NOTE — CHART NOTE - NSCHARTNOTEFT_GEN_A_CORE
Patient underwent a PCI procedure and is being admitted as they are at increased risk for major adverse cardiac and vascular events if discharged due to the following high risk characteristics:      Pre- Procedural Clinical Criteria  angina    Admit- patient underwent a PCI procedure and is being admitted due to high risk characteristics and is considered to be at an increased risk of major adverse cardiovascular events if discharged at this time   s/p successful JENNIFER x 1 to Mid LAD 80% via RRA band . Site intact no hematoma no bleeding noted +radial pulses noted. Good hemostasis   On Asa and Plavix   no complaints of any Cp no sob no palpitations noted. Pt discharged in stable condition, AVS and follow up care reviewed, IV removed, all questions answered

## 2024-07-03 ENCOUNTER — APPOINTMENT (OUTPATIENT)
Dept: CARDIOLOGY | Facility: CLINIC | Age: 59
End: 2024-07-03
Payer: MEDICARE

## 2024-07-03 ENCOUNTER — NON-APPOINTMENT (OUTPATIENT)
Age: 59
End: 2024-07-03

## 2024-07-03 VITALS
HEIGHT: 74 IN | BODY MASS INDEX: 36.06 KG/M2 | WEIGHT: 281 LBS | DIASTOLIC BLOOD PRESSURE: 80 MMHG | OXYGEN SATURATION: 97 % | HEART RATE: 79 BPM | SYSTOLIC BLOOD PRESSURE: 120 MMHG

## 2024-07-03 DIAGNOSIS — E78.5 HYPERLIPIDEMIA, UNSPECIFIED: ICD-10-CM

## 2024-07-03 DIAGNOSIS — I71.20 THORACIC AORTIC ANEURYSM, WITHOUT RUPTURE, UNSPECIFIED: ICD-10-CM

## 2024-07-03 DIAGNOSIS — I25.10 ATHEROSCLEROTIC HEART DISEASE OF NATIVE CORONARY ARTERY W/OUT ANGINA PECTORIS: ICD-10-CM

## 2024-07-03 DIAGNOSIS — R00.1 BRADYCARDIA, UNSPECIFIED: ICD-10-CM

## 2024-07-03 DIAGNOSIS — I10 ESSENTIAL (PRIMARY) HYPERTENSION: ICD-10-CM

## 2024-07-03 PROCEDURE — 99204 OFFICE O/P NEW MOD 45 MIN: CPT

## 2024-07-03 PROCEDURE — 93000 ELECTROCARDIOGRAM COMPLETE: CPT

## 2024-07-05 LAB
25(OH)D3 SERPL-MCNC: 38.2 NG/ML
ALBUMIN SERPL ELPH-MCNC: 4.5 G/DL
ALBUMIN SERPL ELPH-MCNC: 4.5 G/DL
ALP BLD-CCNC: 89 U/L
ALP BLD-CCNC: 89 U/L
ALT SERPL-CCNC: 23 U/L
ALT SERPL-CCNC: 23 U/L
ANION GAP SERPL CALC-SCNC: 15 MMOL/L
APPEARANCE: CLEAR
AST SERPL-CCNC: 22 U/L
AST SERPL-CCNC: 22 U/L
BACTERIA: NEGATIVE /HPF
BASOPHILS # BLD AUTO: 0.04 K/UL
BASOPHILS NFR BLD AUTO: 0.6 %
BILIRUB DIRECT SERPL-MCNC: 0.2 MG/DL
BILIRUB INDIRECT SERPL-MCNC: 0.8 MG/DL
BILIRUB SERPL-MCNC: 1 MG/DL
BILIRUB SERPL-MCNC: 1 MG/DL
BILIRUBIN URINE: NEGATIVE
BLOOD URINE: NEGATIVE
BUN SERPL-MCNC: 13 MG/DL
CALCIUM SERPL-MCNC: 9.5 MG/DL
CAST: 0 /LPF
CHLORIDE SERPL-SCNC: 103 MMOL/L
CHOLEST SERPL-MCNC: 157 MG/DL
CK SERPL-CCNC: 216 U/L
CO2 SERPL-SCNC: 23 MMOL/L
COLOR: YELLOW
CREAT SERPL-MCNC: 0.93 MG/DL
EGFR: 94 ML/MIN/1.73M2
EOSINOPHIL # BLD AUTO: 0.21 K/UL
EOSINOPHIL NFR BLD AUTO: 2.9 %
EPITHELIAL CELLS: 0 /HPF
ESTIMATED AVERAGE GLUCOSE: 120 MG/DL
FOLATE SERPL-MCNC: 8.9 NG/ML
GLUCOSE QUALITATIVE U: NEGATIVE MG/DL
GLUCOSE SERPL-MCNC: 73 MG/DL
HBA1C MFR BLD HPLC: 5.8 %
HCT VFR BLD CALC: 45.2 %
HDLC SERPL-MCNC: 47 MG/DL
HGB BLD-MCNC: 15.1 G/DL
IMM GRANULOCYTES NFR BLD AUTO: 0.6 %
IRON SERPL-MCNC: 94 UG/DL
KETONES URINE: NEGATIVE MG/DL
LDLC SERPL CALC-MCNC: 90 MG/DL
LDLC SERPL DIRECT ASSAY-MCNC: 89 MG/DL
LEUKOCYTE ESTERASE URINE: NEGATIVE
LYMPHOCYTES # BLD AUTO: 2.04 K/UL
LYMPHOCYTES NFR BLD AUTO: 28.7 %
MAGNESIUM SERPL-MCNC: 2.2 MG/DL
MAN DIFF?: NORMAL
MCHC RBC-ENTMCNC: 30 PG
MCHC RBC-ENTMCNC: 33.4 GM/DL
MCV RBC AUTO: 89.7 FL
MICROSCOPIC-UA: NORMAL
MONOCYTES # BLD AUTO: 0.77 K/UL
MONOCYTES NFR BLD AUTO: 10.8 %
NEUTROPHILS # BLD AUTO: 4.02 K/UL
NEUTROPHILS NFR BLD AUTO: 56.4 %
NITRITE URINE: NEGATIVE
NONHDLC SERPL-MCNC: 110 MG/DL
PH URINE: 6.5
PHOSPHATE SERPL-MCNC: 4 MG/DL
PLATELET # BLD AUTO: 197 K/UL
POTASSIUM SERPL-SCNC: 4.5 MMOL/L
PROT SERPL-MCNC: 7.3 G/DL
PROT SERPL-MCNC: 7.3 G/DL
PROTEIN URINE: NEGATIVE MG/DL
PSA SERPL-MCNC: 1.27 NG/ML
RBC # BLD: 5.04 M/UL
RBC # FLD: 12.5 %
RED BLOOD CELLS URINE: 0 /HPF
SODIUM SERPL-SCNC: 141 MMOL/L
SPECIFIC GRAVITY URINE: 1.02
T4 FREE SERPL-MCNC: 0.8 NG/DL
TRIGL SERPL-MCNC: 111 MG/DL
TSH SERPL-ACNC: 2.63 UIU/ML
URATE SERPL-MCNC: 6.1 MG/DL
UROBILINOGEN URINE: 0.2 MG/DL
VIT B12 SERPL-MCNC: 390 PG/ML
WBC # FLD AUTO: 7.12 K/UL
WHITE BLOOD CELLS URINE: 0 /HPF

## 2024-07-08 ENCOUNTER — APPOINTMENT (OUTPATIENT)
Dept: PULMONOLOGY | Facility: CLINIC | Age: 59
End: 2024-07-08
Payer: COMMERCIAL

## 2024-07-08 ENCOUNTER — APPOINTMENT (OUTPATIENT)
Dept: PULMONOLOGY | Facility: CLINIC | Age: 59
End: 2024-07-08
Payer: MEDICARE

## 2024-07-08 VITALS — OXYGEN SATURATION: 97 % | SYSTOLIC BLOOD PRESSURE: 126 MMHG | DIASTOLIC BLOOD PRESSURE: 80 MMHG | HEART RATE: 54 BPM

## 2024-07-08 VITALS — HEART RATE: 55 BPM

## 2024-07-08 DIAGNOSIS — G47.33 OBSTRUCTIVE SLEEP APNEA (ADULT) (PEDIATRIC): ICD-10-CM

## 2024-07-08 DIAGNOSIS — R06.02 SHORTNESS OF BREATH: ICD-10-CM

## 2024-07-08 PROCEDURE — 94727 GAS DIL/WSHOT DETER LNG VOL: CPT

## 2024-07-08 PROCEDURE — 95012 NITRIC OXIDE EXP GAS DETER: CPT

## 2024-07-08 PROCEDURE — 94729 DIFFUSING CAPACITY: CPT

## 2024-07-08 PROCEDURE — 99213 OFFICE O/P EST LOW 20 MIN: CPT | Mod: 25

## 2024-07-08 PROCEDURE — 94060 EVALUATION OF WHEEZING: CPT

## 2024-07-08 PROCEDURE — ZZZZZ: CPT

## 2024-07-10 LAB
VIT B1 SERPL-MCNC: 116.6 NMOL/L
VIT B6 SERPL-MCNC: 9.3 UG/L

## 2024-07-12 LAB — APO LP(A) SERPL-MCNC: 203.2 NMOL/L

## 2025-02-15 ENCOUNTER — RX RENEWAL (OUTPATIENT)
Age: 60
End: 2025-02-15

## 2025-04-04 NOTE — ED ADULT TRIAGE NOTE - SOURCE OF INFORMATION
Care Transitions Note    Follow Up Call     Attempted to reach patient for transitions of care follow up.  Unable to reach patient.      Outreach Attempts:   HIPAA compliant voicemail left for patient.   [employer] message sent.     Care Summary Note: Noted patient seen by pcp on 3/25/25    Follow Up Appointment:   Future Appointments         Provider Specialty Dept Phone    4/15/2025 1:00 PM Denis Dickens MD Family Medicine [phone]    4/17/2025 10:00 AM Gretchen Mullins PA              No further follow-up call indicated based on severity of symptoms and risk factors. Plan for next call:  unable to reach patient x 3 attempts    Yeni Alba LPN       
Patient

## 2025-06-02 ENCOUNTER — NON-APPOINTMENT (OUTPATIENT)
Age: 60
End: 2025-06-02

## 2025-06-02 ENCOUNTER — APPOINTMENT (OUTPATIENT)
Dept: CARDIOLOGY | Facility: CLINIC | Age: 60
End: 2025-06-02
Payer: COMMERCIAL

## 2025-06-02 VITALS
OXYGEN SATURATION: 96 % | BODY MASS INDEX: 33.75 KG/M2 | HEART RATE: 52 BPM | TEMPERATURE: 98.2 F | DIASTOLIC BLOOD PRESSURE: 70 MMHG | SYSTOLIC BLOOD PRESSURE: 120 MMHG | WEIGHT: 263 LBS | HEIGHT: 74 IN

## 2025-06-02 DIAGNOSIS — I71.20 THORACIC AORTIC ANEURYSM, WITHOUT RUPTURE, UNSPECIFIED: ICD-10-CM

## 2025-06-02 DIAGNOSIS — L98.7 EXCESSIVE AND REDUNDANT SKIN AND SUBCUTANEOUS TISSUE: ICD-10-CM

## 2025-06-02 DIAGNOSIS — I25.10 ATHEROSCLEROTIC HEART DISEASE OF NATIVE CORONARY ARTERY W/OUT ANGINA PECTORIS: ICD-10-CM

## 2025-06-02 DIAGNOSIS — E56.9 VITAMIN DEFICIENCY, UNSPECIFIED: ICD-10-CM

## 2025-06-02 DIAGNOSIS — I10 ESSENTIAL (PRIMARY) HYPERTENSION: ICD-10-CM

## 2025-06-02 DIAGNOSIS — E78.5 HYPERLIPIDEMIA, UNSPECIFIED: ICD-10-CM

## 2025-06-02 DIAGNOSIS — R79.89 OTHER SPECIFIED ABNORMAL FINDINGS OF BLOOD CHEMISTRY: ICD-10-CM

## 2025-06-02 DIAGNOSIS — E66.9 OBESITY, UNSPECIFIED: ICD-10-CM

## 2025-06-02 DIAGNOSIS — R73.03 PREDIABETES.: ICD-10-CM

## 2025-06-02 DIAGNOSIS — M17.0 BILATERAL PRIMARY OSTEOARTHRITIS OF KNEE: ICD-10-CM

## 2025-06-02 DIAGNOSIS — I35.1 NONRHEUMATIC AORTIC (VALVE) INSUFFICIENCY: ICD-10-CM

## 2025-06-02 PROCEDURE — 93000 ELECTROCARDIOGRAM COMPLETE: CPT

## 2025-06-02 PROCEDURE — G2211 COMPLEX E/M VISIT ADD ON: CPT | Mod: NC

## 2025-06-02 PROCEDURE — 99214 OFFICE O/P EST MOD 30 MIN: CPT

## 2025-06-02 RX ORDER — METOPROLOL SUCCINATE 25 MG/1
25 TABLET, EXTENDED RELEASE ORAL
Qty: 30 | Refills: 1 | Status: ACTIVE | COMMUNITY
Start: 2025-06-02

## 2025-06-02 RX ORDER — CHOLECALCIFEROL (VITAMIN D3) 1250 MCG
1.25 MG CAPSULE ORAL
Refills: 0 | Status: ACTIVE | COMMUNITY
Start: 2025-06-02

## 2025-06-02 RX ORDER — TESTOSTERONE 20.25 MG/1.25G
1.62 GEL, METERED TRANSDERMAL
Qty: 1 | Refills: 0 | Status: ACTIVE | COMMUNITY
Start: 2025-06-02

## 2025-06-03 PROBLEM — M17.0 PRIMARY OSTEOARTHRITIS OF BOTH KNEES: Status: ACTIVE | Noted: 2025-06-03

## 2025-06-03 PROBLEM — I71.21 ANEURYSM OF ASCENDING AORTA WITHOUT RUPTURE: Status: ACTIVE | Noted: 2025-06-02

## 2025-06-10 ENCOUNTER — NON-APPOINTMENT (OUTPATIENT)
Age: 60
End: 2025-06-10

## 2025-06-10 ENCOUNTER — APPOINTMENT (OUTPATIENT)
Dept: CARDIOTHORACIC SURGERY | Facility: CLINIC | Age: 60
End: 2025-06-10
Payer: MEDICARE

## 2025-06-10 VITALS
BODY MASS INDEX: 33.75 KG/M2 | WEIGHT: 263 LBS | TEMPERATURE: 96 F | SYSTOLIC BLOOD PRESSURE: 121 MMHG | HEART RATE: 64 BPM | HEIGHT: 74 IN | OXYGEN SATURATION: 96 % | DIASTOLIC BLOOD PRESSURE: 80 MMHG

## 2025-06-10 PROBLEM — Q25.43 ANEURYSM OF AORTIC ROOT: Status: ACTIVE | Noted: 2025-06-10

## 2025-06-10 PROBLEM — Z82.0 FAMILY HISTORY OF ALZHEIMER'S DISEASE: Status: ACTIVE | Noted: 2025-06-10

## 2025-06-10 LAB
ALBUMIN SERPL ELPH-MCNC: 4.8 G/DL
ALP BLD-CCNC: 81 U/L
ALT SERPL-CCNC: 29 U/L
ANION GAP SERPL CALC-SCNC: 16 MMOL/L
AST SERPL-CCNC: 22 U/L
BILIRUB DIRECT SERPL-MCNC: 0.42 MG/DL
BILIRUB INDIRECT SERPL-MCNC: 1.3 MG/DL
BILIRUB SERPL-MCNC: 1.7 MG/DL
BUN SERPL-MCNC: 18 MG/DL
CALCIUM SERPL-MCNC: 10.3 MG/DL
CHLORIDE SERPL-SCNC: 104 MMOL/L
CK SERPL-CCNC: 179 U/L
CO2 SERPL-SCNC: 24 MMOL/L
CREAT SERPL-MCNC: 1.06 MG/DL
CRP SERPL HS-MCNC: 2.85 MG/L
EGFRCR SERPLBLD CKD-EPI 2021: 80 ML/MIN/1.73M2
ESTIMATED AVERAGE GLUCOSE: 114 MG/DL
GLUCOSE SERPL-MCNC: 91 MG/DL
HBA1C MFR BLD HPLC: 5.6 %
LDLC SERPL DIRECT ASSAY-MCNC: 73 MG/DL
POTASSIUM SERPL-SCNC: 5 MMOL/L
PROT SERPL-MCNC: 7.7 G/DL
SODIUM SERPL-SCNC: 144 MMOL/L

## 2025-06-10 PROCEDURE — 99204 OFFICE O/P NEW MOD 45 MIN: CPT

## 2025-06-12 ENCOUNTER — APPOINTMENT (OUTPATIENT)
Dept: CARDIOLOGY | Facility: CLINIC | Age: 60
End: 2025-06-12

## 2025-06-26 ENCOUNTER — RESULT CHARGE (OUTPATIENT)
Age: 60
End: 2025-06-26

## 2025-06-26 ENCOUNTER — APPOINTMENT (OUTPATIENT)
Dept: PULMONOLOGY | Facility: CLINIC | Age: 60
End: 2025-06-26
Payer: MEDICARE

## 2025-06-26 VITALS
HEIGHT: 74 IN | OXYGEN SATURATION: 98 % | HEART RATE: 70 BPM | RESPIRATION RATE: 16 BRPM | SYSTOLIC BLOOD PRESSURE: 107 MMHG | DIASTOLIC BLOOD PRESSURE: 73 MMHG | WEIGHT: 260 LBS | BODY MASS INDEX: 33.37 KG/M2

## 2025-06-26 LAB — POCT - HEMOGLOBIN (HGB), QUANTITATIVE, TRANSCUTANEOUS: 13.7

## 2025-06-26 PROCEDURE — 94729 DIFFUSING CAPACITY: CPT

## 2025-06-26 PROCEDURE — 94726 PLETHYSMOGRAPHY LUNG VOLUMES: CPT

## 2025-06-26 PROCEDURE — 99213 OFFICE O/P EST LOW 20 MIN: CPT | Mod: 25

## 2025-06-26 PROCEDURE — ZZZZZ: CPT

## 2025-06-26 PROCEDURE — 94010 BREATHING CAPACITY TEST: CPT
